# Patient Record
Sex: FEMALE | Race: WHITE | Employment: FULL TIME | URBAN - METROPOLITAN AREA
[De-identification: names, ages, dates, MRNs, and addresses within clinical notes are randomized per-mention and may not be internally consistent; named-entity substitution may affect disease eponyms.]

---

## 2017-03-01 ENCOUNTER — OFFICE VISIT (OUTPATIENT)
Dept: MIDWIFE SERVICES | Facility: CLINIC | Age: 52
End: 2017-03-01
Payer: COMMERCIAL

## 2017-03-01 VITALS
HEART RATE: 57 BPM | BODY MASS INDEX: 23.06 KG/M2 | TEMPERATURE: 97 F | SYSTOLIC BLOOD PRESSURE: 116 MMHG | WEIGHT: 155.7 LBS | HEIGHT: 69 IN | DIASTOLIC BLOOD PRESSURE: 55 MMHG

## 2017-03-01 DIAGNOSIS — Z00.00 ROUTINE GENERAL MEDICAL EXAMINATION AT A HEALTH CARE FACILITY: Primary | ICD-10-CM

## 2017-03-01 PROCEDURE — 99396 PREV VISIT EST AGE 40-64: CPT | Performed by: ADVANCED PRACTICE MIDWIFE

## 2017-03-01 NOTE — PROGRESS NOTES
Ashanti is a 51 year old  female who presents for annual exam.     Menses are 25-28 days and normal lasting 4 days.  Menses flow: normal.  Patient's last menstrual period was 2017 (exact date).. Using none for contraception.  She is not currently considering pregnancy. She is noticing that she is skipping more cycles. Feels like it comes whenever it wants.   Besides routine health maintenance, she has no other health concerns today.  has been having some health concerns that makes intercourse difficult - discussed that.   GYNECOLOGIC HISTORY:  Menarche: 15  Age at first intercourse: 42 Number of lifetime partners: <6  Ashanti is sexually active with male partner(s) and is currently in monogamous relationship.    History sexually transmitted infections:No STD history  STI testing offered?  Declined  YURIDIA exposure: No  History of abnormal Pap smear: NO - age 30- 65 PAP every 3 years recommended  Family history of breast CA: No  Family history of uterine/ovarian CA: No    Family history of colon CA: No    HEALTH MAINTENANCE:  Cholesterol:   Cholesterol   Date Value Ref Range Status   2013 186 0 - 200 mg/dL Final     Comment:     LDL Cholesterol is the primary guide to therapy.   The NCEP recommends further evaluation of: patients with cholesterol greater   than 200 mg/dL if additional risk factors are present, cholesterol greater   than   240 mg/dL, triglycerides greater than 150 mg/dL, or HDL less than 40 mg/dL.   07/10/2007 207 (H) 0 - 200 mg/dL Final     Comment:     LDL Cholesterol is the primary guide to therapy: LDL-cholesterol goal in high   risk patients is <100 mg/dL and in very high risk patients is <70 mg/dL.   The NCEP recommends further evaluation of: patients with cholesterol <200   mg/dL   if additionalrisk factors are present, cholesterol >240 mg/dL, triglycerides   >150 mg/dL, or HDL <40 mg/dL.    History of abnormal lipids: No  Mammo: 6/17/15 . History of abnormal Mammo: No,  .  Regular Self Breast Exams: Yes  Calcium/Vitamin D intake: source:  dairy Adequate? Yes  TSH:   TSH   Date Value Ref Range Status   2015 1.93 0.40 - 4.00 mU/L Final     Comment:     Effective 2014, the reference range for this assay has changed to reflect   new instrumentation/methodology.       Lab Results   Component Value Date    PAP OTHER-NIL, See Result 2016    PAP NIL 2015    PAP OTHER-NIL, See Result 2012     HISTORY:  Obstetric History       T0      TAB0   SAB0   E0   M0   L0         Past Medical History   Diagnosis Date     CARDIOVASCULAR SCREENING; LDL GOAL LESS THAN 130      Cervical high risk HPV (human papillomavirus) test positive 3/2015     NIL pap, + HPV (not 16 or 18)     Dislocation of patella, left, closed      NO ACTIVE PROBLEMS      Past Surgical History   Procedure Laterality Date     C appendectomy       acute     Family History   Problem Relation Age of Onset     C.A.D. Father      MI  age 59     DIABETES Father      Alcohol/Drug Brother      ETOH     Cardiovascular Brother      CEREBROVASCULAR DISEASE Maternal Grandfather      CANCER Maternal Grandmother      ovarian     C.A.D. Paternal Grandfather      MI     HEART DISEASE Brother      right exterior septal myocardial fibrosis/arteriosclorsis     Gynecology Maternal Aunt      ovarian cysts     Gynecology Maternal Aunt      uterine tumor     Lipids Mother      resolved     Other - See Comments Paternal Grandmother      reumatic fever     CEREBROVASCULAR DISEASE Brother      HEART DISEASE Brother      Social History     Social History     Marital status:      Spouse name: N/A     Number of children: N/A     Years of education: N/A     Social History Main Topics     Smoking status: Never Smoker     Smokeless tobacco: Never Used     Alcohol use 0.5 - 1.5 oz/week     1 - 3 drink(s) per week     Drug use: No     Sexual activity: Yes     Partners: Male     Birth control/ protection:       Other Topics Concern     Not on file     Social History Narrative    Caffeine intake/servings daily - 12 oz decafe    Calcium intake/servings daily - 8 oz of milk, yogurt and cheese (veggies)    Exercise 3 times weekly - 3-4    Sunscreen used - yes    Seatbelts used - yes    Guns stored in the home -no    Self Breast Exam - yes    Pap test up to date - yes     Eye exam up to date - no. Pt has never had a eye exam.  Pt states no need for glasses    Dental exam up to date -  yes    DEXA scan up to date -  Not Applicable    Flex Sig/Colonoscopy up to date -  Not Applicable    Mammography up to date -  Yes, 3/16/11 NEG    Immunizations reviewed and up to date - Yes and TD 2009    Abuse: Current or Past (Physical, Sexual or Emotional) - No    Do you feel safe in your environment - Yes    Do you cope well with stress - Yes    Do you suffer from insomnia - No    Last Updated BY: Marie Clifford MA 1/23/2012                       Current Outpatient Prescriptions:      Glucosamine-Chondroitin (GLUCOSAMINE CHONDR COMPLEX PO), , Disp: , Rfl:      Omega-3 Fatty Acids (OMEGA 3 PO), Take  by mouth daily., Disp: , Rfl:      Prenatal MV-Min-Fe Cbn-FA-DHA (BRAINSTRONG PRENATAL) 33-0.8 & 350 MG MISC, Take 1 tablet by mouth daily., Disp: , Rfl:      Allergies   Allergen Reactions     No Known Drug Allergies        Past medical, surgical, social and family history were reviewed and updated in EPIC.    ROS:   C:     NEGATIVE for fever, chills, change in weight  I:       NEGATIVE for worrisome rashes, moles or lesions  E:     NEGATIVE for vision changes or irritation  E/M: NEGATIVE for ear, mouth and throat problems  R:     NEGATIVE for significant cough or SOB  CV:   NEGATIVE for chest pain, palpitations or peripheral edema  GI:     NEGATIVE for nausea, abdominal pain, heartburn, or change in bowel habits  :   NEGATIVE for frequency, dysuria, hematuria, vaginal discharge, or irregular bleeding  M:     NEGATIVE for significant  "arthralgias or myalgia  N:      NEGATIVE for weakness, dizziness or paresthesias  E:      NEGATIVE for temperature intolerance, skin/hair changes  P:      NEGATIVE for changes in mood or affect.    EXAM:  /55  Pulse 57  Temp 97  F (36.1  C) (Oral)  Ht 5' 8.5\" (1.74 m)  Wt 155 lb 11.2 oz (70.6 kg)  LMP 01/05/2017 (Exact Date)  BMI 23.33 kg/m2   BMI: Body mass index is 23.33 kg/(m^2).  Constitutional: healthy, alert and no distress  Head: Normocephalic. No masses, lesions, tenderness or abnormalities  Neck: Neck supple. Trachea midline. No adenopathy. Thyroid symmetric, normal size.   Cardiovascular: RRR.   Respiratory: Negative.   Breast: Breasts reveal mild symmetric fibrocystic densities, but there are no dominant, discrete, fixed or suspicious masses found.  Gastrointestinal: Abdomen soft, non-tender, non-distended. No masses, organomegaly.  :  Vulva:  No external lesions, normal female hair distribution, no inguinal adenopathy.    Urethra:  Midline, non-tender, well supported, no discharge  Vagina:  Moist, pink, no abnormal discharge, no lesions  Uterus:  Normal size, midposition , non-tender, freely mobile  Ovaries:  No masses appreciated, non-tender, mobile  Musculoskeletal: extremities normal  Skin: no suspicious lesions or rashes  Psychiatric: Affect appropriate, cooperative,mentation appears normal.     COUNSELING:        Regular exercise       Healthy diet/nutrition       Osteoporosis Prevention/Bone Health   reports that she has never smoked. She has never used smokeless tobacco.    Body mass index is 23.33 kg/(m^2).    ASSESSMENT:  51 year old female with satisfactory annual exam  (Z00.00) Routine general medical examination at a health care facility  (primary encounter diagnosis)  Comment:   Plan: MA Screening Digital Bilateral, TSH with free         T4 reflex, Lipid Profile       PLAN:   Order in for future labs (TSH and Lipid profile). Will plan to come fasting on a morning that works best for " her. Will call to schedule lab only visit.   Will make appointment for mammogram  RTC yearly for annual exam or sooner stan James CNM

## 2017-03-01 NOTE — NURSING NOTE
"Chief Complaint   Patient presents with     Physical     annual       Initial /55  Pulse 57  Temp 97  F (36.1  C) (Oral)  Ht 5' 8.5\" (1.74 m)  Wt 155 lb 11.2 oz (70.6 kg)  LMP 2017 (Exact Date)  BMI 23.33 kg/m2 Estimated body mass index is 23.33 kg/(m^2) as calculated from the following:    Height as of this encounter: 5' 8.5\" (1.74 m).    Weight as of this encounter: 155 lb 11.2 oz (70.6 kg).  BP completed using cuff size: regular        The following HM Due: NONE      The following patient reported/Care Every where data was sent to:  P ABSTRACT QUALITY INITIATIVES [[id number]]       patient has appointment for today  Anabel Guan               "

## 2017-03-01 NOTE — MR AVS SNAPSHOT
After Visit Summary   3/1/2017    Ashanti Yanes    MRN: [id number]           Patient Information     Date Of Birth          1965        Visit Information        Provider Department      3/1/2017 5:15 PM Gloria James APRN CNM [facility] [place] [facility]        Today's Diagnoses     Routine general medical examination at a health care facility    -  1       Follow-ups after your visit        Future tests that were ordered for you today     Open Future Orders        Priority Expected Expires Ordered    TSH with free T4 reflex Routine  3/1/2018 3/1/2017    Lipid Profile Routine  3/1/2018 3/1/2017    MA Screening Digital Bilateral Routine  3/1/2018 3/1/2017            Who to contact     If you have questions or need follow up information about today's clinic visit or your schedule please contact [facility] directly at [phone].  Normal or non-critical lab and imaging results will be communicated to you by MyChart, letter or phone within 4 business days after the clinic has received the results. If you do not hear from us within 7 days, please contact the clinic through [employer] or phone. If you have a critical or abnormal lab result, we will notify you by phone as soon as possible.  Submit refill requests through [employer] or call your pharmacy and they will forward the refill request to us. Please allow 3 business days for your refill to be completed.          Additional Information About Your Visit        MyChart Information     [employer] gives you secure access to your electronic health record. If you see a primary care provider, you can also send messages to your care team and make appointments. If you have questions, please call your primary care clinic.  If you do not have a primary care provider, please call [phone] and they will assist you.        Care EveryWhere ID     This is your Care EveryWhere ID. This could be used by other organizations to  "access your [place] medical records  [id number]        Your Vitals Were     Pulse Temperature Height Last Period BMI (Body Mass Index)       57 97  F (36.1  C) (Oral) 5' 8.5\" (1.74 m) 01/05/2017 (Exact Date) 23.33 kg/m2        Blood Pressure from Last 3 Encounters:   03/01/17 116/55   03/17/16 117/67   03/27/15 113/49    Weight from Last 3 Encounters:   03/01/17 155 lb 11.2 oz (70.6 kg)   03/17/16 157 lb 6.4 oz (71.4 kg)   03/27/15 147 lb 9.6 oz (67 kg)               Primary Care Provider    M[facility] Other Clinic                Thank you!     Thank you for choosing [facility]  for your care. Our goal is always to provide you with excellent care. Hearing back from our patients is one way we can continue to improve our services. Please take a few minutes to complete the written survey that you may receive in the mail after your visit with us. Thank you!             Your Updated Medication List - Protect others around you: Learn how to safely use, store and throw away your medicines at www.disposemymeds.org.          This list is accurate as of: 3/1/17 11:59 PM.  Always use your most recent med list.                   Brand Name Dispense Instructions for use    BRAINSTRONG PRENATAL 33-0.8 & 350 MG Misc      Take 1 tablet by mouth daily.       GLUCOSAMINE CHONDR COMPLEX PO          OMEGA 3 PO      Take  by mouth daily.         "

## 2017-03-31 DIAGNOSIS — Z00.00 ROUTINE GENERAL MEDICAL EXAMINATION AT A HEALTH CARE FACILITY: ICD-10-CM

## 2017-03-31 LAB
CHOLEST SERPL-MCNC: 203 MG/DL
HDLC SERPL-MCNC: 68 MG/DL
LDLC SERPL CALC-MCNC: 125 MG/DL
NONHDLC SERPL-MCNC: 135 MG/DL
TRIGL SERPL-MCNC: 51 MG/DL
TSH SERPL DL<=0.005 MIU/L-ACNC: 1.67 MU/L (ref 0.4–4)

## 2017-03-31 PROCEDURE — 36415 COLL VENOUS BLD VENIPUNCTURE: CPT | Performed by: ADVANCED PRACTICE MIDWIFE

## 2017-03-31 PROCEDURE — 80061 LIPID PANEL: CPT | Performed by: ADVANCED PRACTICE MIDWIFE

## 2017-03-31 PROCEDURE — 84443 ASSAY THYROID STIM HORMONE: CPT | Performed by: ADVANCED PRACTICE MIDWIFE

## 2018-05-01 ENCOUNTER — OFFICE VISIT (OUTPATIENT)
Dept: MIDWIFE SERVICES | Facility: CLINIC | Age: 53
End: 2018-05-01
Payer: COMMERCIAL

## 2018-05-01 VITALS
SYSTOLIC BLOOD PRESSURE: 94 MMHG | OXYGEN SATURATION: 97 % | WEIGHT: 150.3 LBS | DIASTOLIC BLOOD PRESSURE: 66 MMHG | BODY MASS INDEX: 22.26 KG/M2 | HEART RATE: 75 BPM | HEIGHT: 69 IN

## 2018-05-01 DIAGNOSIS — Z00.00 ROUTINE GENERAL MEDICAL EXAMINATION AT A HEALTH CARE FACILITY: Primary | ICD-10-CM

## 2018-05-01 PROCEDURE — 99396 PREV VISIT EST AGE 40-64: CPT | Performed by: ADVANCED PRACTICE MIDWIFE

## 2018-05-01 NOTE — NURSING NOTE
"Chief Complaint   Patient presents with     Physical     LMP 2017       Initial BP 94/66  Pulse 75  Ht 5' 8.5\" (1.74 m)  Wt 150 lb 4.8 oz (68.2 kg)  LMP 2017 (Exact Date)  SpO2 97%  BMI 22.52 kg/m2 Estimated body mass index is 22.52 kg/(m^2) as calculated from the following:    Height as of this encounter: 5' 8.5\" (1.74 m).    Weight as of this encounter: 150 lb 4.8 oz (68.2 kg).  BP completed using cuff size: regular        The following HM Due:  dexa, mammo and colon      The following patient reported/Care Every where data was sent to:  P ABSTRACT QUALITY INITIATIVES [[id number]]  na      patient has appointment for today and orders have been placed              "

## 2018-05-01 NOTE — MR AVS SNAPSHOT
"              After Visit Summary   5/1/2018    Ashanti Yanes    MRN: [id number]           Patient Information     Date Of Birth          1965        Visit Information        Provider Department      5/1/2018 3:00 PM Gloria James APRN CNM [facility]        Today's Diagnoses     Routine general medical examination at a health care facility    -  1       Follow-ups after your visit        Who to contact     If you have questions or need follow up information about today's clinic visit or your schedule please contact [facility] directly at [phone].  Normal or non-critical lab and imaging results will be communicated to you by MyChart, letter or phone within 4 business days after the clinic has received the results. If you do not hear from us within 7 days, please contact the clinic through [employer] or phone. If you have a critical or abnormal lab result, we will notify you by phone as soon as possible.  Submit refill requests through [employer] or call your pharmacy and they will forward the refill request to us. Please allow 3 business days for your refill to be completed.          Additional Information About Your Visit        MyChart Information     [employer] gives you secure access to your electronic health record. If you see a primary care provider, you can also send messages to your care team and make appointments. If you have questions, please call your primary care clinic.  If you do not have a primary care provider, please call [phone] and they will assist you.        Care EveryWhere ID     This is your Care EveryWhere ID. This could be used by other organizations to access your [place] medical records  [id number]        Your Vitals Were     Pulse Height Last Period Pulse Oximetry BMI (Body Mass Index)       75 5' 8.5\" (1.74 m) 01/05/2017 (Exact Date) 97% 22.52 kg/m2        Blood Pressure from Last 3 Encounters:   05/01/18 94/66   03/01/17 " 116/55   03/17/16 117/67    Weight from Last 3 Encounters:   05/01/18 150 lb 4.8 oz (68.2 kg)   03/01/17 155 lb 11.2 oz (70.6 kg)   03/17/16 157 lb 6.4 oz (71.4 kg)              Today, you had the following     No orders found for display       Primary Care Provider Fax #    Physician No Ref-Primary [phone]       No address on file        Equal Access to Services     ABDELRAHMAN ASTUDILLO : Hadii aad ku hadasho Soomaali, waaxda luqadaha, qaybta kaalmada adeegyada, waxay tonyain thomas cole . So [facility] [phone].    ATENCIÓN: Si habla español, tiene a parham disposición servicios gratuitos de asistencia lingüística. Llame al [phone].    We comply with applicable federal civil rights laws and [place] laws. We do not discriminate on the basis of race, color, national origin, age, disability, sex, sexual orientation, or gender identity.            Thank you!     Thank you for choosing [facility]  for your care. Our goal is always to provide you with excellent care. Hearing back from our patients is one way we can continue to improve our services. Please take a few minutes to complete the written survey that you may receive in the mail after your visit with us. Thank you!             Your Updated Medication List - Protect others around you: Learn how to safely use, store and throw away your medicines at www.disposemymeds.org.          This list is accurate as of 5/1/18 11:59 PM.  Always use your most recent med list.                   Brand Name Dispense Instructions for use Diagnosis    GLUCOSAMINE CHONDR COMPLEX PO           OMEGA 3 PO      Take  by mouth daily.        ONE-A-DAY WOMENS PO

## 2018-05-01 NOTE — PROGRESS NOTES
Ashanti is a 52 year old  who presents for annual exam.   Postmenopausal.  She is not having vaginal dryness. No vaginal bleeding noted.     Besides routine health maintenance, she has no other health concerns today . LMP was in 2017. Still having cyclical symptoms (bloating, cramping) that make her think she is going to get a period but then she never bleeds. She does not notice significant symptoms related to menopause.  still having problems so unable to have intercourse.  GYNECOLOGIC HISTORY:  Menarche: 15     She is sexually active with 1male partner(s) and she is currently in monogamous relationship.    History sexually transmitted infections:No STD history  STI testing offered?  Declined  Estrogen replacement therapy: No  YURIDIA exposure: Unknown    History of abnormal Pap smear: NO - age 30- 65 PAP every 3 years recommended  Family history of breast CA: No  Family history of uterine/ovarian CA: Yes (Please explain): mgm and m-aunt  Family history of colon CA: No    HEALTH MAINTENANCE:  Cholesterol: (No components found for: CHOL2 ) History of abnormal lipids: Yes  Mammo: 3/2017 . History of abnormal Mammo: No  Regular Self Breast Exams: Yes  Colonoscopy: na History of abnormal Colonoscopy: No  Dexa: na History of abnormal Dexa: dont done  Calcium/Vitamin D intake: source:  dairy Adequate? Yes  TSH: (No components found for: TSH1 )  Pap; (  Lab Results   Component Value Date    PAP OTHER-NIL, See Result 2016    PAP NIL 2015    PAP OTHER-NIL, See Result 2012    )    HISTORY:  Obstetric History       T0      L0     SAB0   TAB0   Ectopic0   Multiple0   Live Births0         Past Medical History:   Diagnosis Date     CARDIOVASCULAR SCREENING; LDL GOAL LESS THAN 130      Cervical high risk HPV (human papillomavirus) test positive 3/2015    NIL pap, + HPV (not 16 or 18)     Dislocation of patella, left, closed      NO ACTIVE PROBLEMS      Past Surgical History:    Procedure Laterality Date     C APPENDECTOMY  1986    acute     Family History   Problem Relation Age of Onset     C.A.D. Father      MI  age 59     DIABETES Father      CEREBROVASCULAR DISEASE Maternal Grandfather      CANCER Maternal Grandmother      ovarian     C.A.D. Paternal Grandfather      MI     Lipids Mother      resolved     Other - See Comments Paternal Grandmother      reumatic fever     CEREBROVASCULAR DISEASE Brother      HEART DISEASE Brother      Alcohol/Drug Brother      ETOH     Cardiovascular Brother      HEART DISEASE Brother      right exterior septal myocardial fibrosis/arteriosclorsis     Gynecology Maternal Aunt      ovarian cysts     Gynecology Maternal Aunt      uterine tumor     Social History     Social History     Marital status:      Spouse name: N/A     Number of children: N/A     Years of education: N/A     Social History Main Topics     Smoking status: Never Smoker     Smokeless tobacco: Never Used     Alcohol use 0.5 - 1.5 oz/week     1 - 3 drink(s) per week     Drug use: No     Sexual activity: Yes     Partners: Male     Birth control/ protection:      Other Topics Concern     None     Social History Narrative    Caffeine intake/servings daily - 12 oz decafe    Calcium intake/servings daily - 8 oz of milk, yogurt and cheese (veggies)    Exercise 3 times weekly - 3-4    Sunscreen used - yes    Seatbelts used - yes    Guns stored in the home -no    Self Breast Exam - yes    Pap test up to date - yes     Eye exam up to date - no. Pt has never had a eye exam.  Pt states no need for glasses    Dental exam up to date -  yes    DEXA scan up to date -  Not Applicable    Flex Sig/Colonoscopy up to date -  Not Applicable    Mammography up to date -  Yes, 3/16/11 NEG    Immunizations reviewed and up to date - Yes and TD 2009    Abuse: Current or Past (Physical, Sexual or Emotional) - No    Do you feel safe in your environment - Yes    Do you cope well with stress - Yes    Do you  "suffer from insomnia - No    Last Updated BY: Marie Clifford MA 1/23/2012                       Current Outpatient Prescriptions:      Multiple Vitamins-Calcium (ONE-A-DAY WOMENS PO), , Disp: , Rfl:      Glucosamine-Chondroitin (GLUCOSAMINE CHONDR COMPLEX PO), , Disp: , Rfl:      Omega-3 Fatty Acids (OMEGA 3 PO), Take  by mouth daily., Disp: , Rfl:      Allergies   Allergen Reactions     No Known Drug Allergies        Past medical, surgical, social and family history were reviewed and updated in EPIC.    ROS:   C:       NEGATIVE for fever, chills, change in weight  I:         NEGATIVE for worrisome rashes, moles or lesions  E:       NEGATIVE for vision changes or irritation  E/M:   NEGATIVE for ear, mouth and throat problems  R:       NEGATIVE for significant cough or SOB  CV:     NEGATIVE for chest pain, palpitations or peripheral edema  GI:      NEGATIVE for nausea, abdominal pain, heartburn, or change in bowel habits  :    NEGATIVE for frequency, dysuria, hematuria, vaginal discharge, or bleeding  M:       NEGATIVE for significant arthralgias or myalgia  N:       NEGATIVE for weakness, dizziness or paresthesias  E:       NEGATIVE for temperature intolerance, skin/hair changes  P:       NEGATIVE for changes in mood or affect    EXAM:  Pulse 75  Ht 5' 8.5\" (1.74 m)  Wt 150 lb 4.8 oz (68.2 kg)  LMP 01/05/2017 (Exact Date)  SpO2 97%  BMI 22.52 kg/m2   BMI: Body mass index is 22.52 kg/(m^2).  Constitutional: healthy, alert and no distress  Head: Normocephalic. No masses, lesions, tenderness or abnormalities  Neck: Neck supple. Trachea midline. No adenopathy. Thyroid symmetric, normal size.   Cardiovascular: RRR.   Respiratory: Negative.   Breast: No nodularity, asymmetry or nipple discharge bilaterally.  Gastrointestinal: Abdomen soft, non-tender, non-distended. No masses, organomegaly  :  Vulva:  No external lesions, normal female hair distribution, no inguinal adenopathy.    Urethra:  Midline, non-tender, well " supported, no discharge  Vagina:  Atrophic, no abnormal discharge, no lesions  Uterus:  Normal size, anteverted , non-tender, freely mobile  Ovaries:  No masses appreciated, non-tender, mobile  Rectal Exam: deferred  Musculoskeletal: extremities normal  Skin: no suspicious lesions or rashes  Psychiatric: Affect appropriate, cooperative,mentation appears normal.     COUNSELING:        Regular exercise       Healthy diet/nutrition       Osteoporosis Prevention/Bone Health   reports that she has never smoked. She has never used smokeless tobacco.    Body mass index is 22.52 kg/(m^2).     ASSESSMENT/PLAN:  52 year old  with satisfactory annual exam      Pt prefers mammogram screening every other year   Will plan for mammogram, TSH and pap in 2019  Due to start FIT screening as well.  Return to clinic yearly for annual exam or sooner prn  Gloria James CNM

## 2018-06-03 ENCOUNTER — HEALTH MAINTENANCE LETTER (OUTPATIENT)
Age: 53
End: 2018-06-03

## 2019-05-14 ENCOUNTER — OFFICE VISIT (OUTPATIENT)
Dept: MIDWIFE SERVICES | Facility: CLINIC | Age: 54
End: 2019-05-14
Payer: COMMERCIAL

## 2019-05-14 VITALS
WEIGHT: 153 LBS | HEART RATE: 56 BPM | SYSTOLIC BLOOD PRESSURE: 113 MMHG | BODY MASS INDEX: 22.66 KG/M2 | OXYGEN SATURATION: 95 % | HEIGHT: 69 IN | DIASTOLIC BLOOD PRESSURE: 69 MMHG

## 2019-05-14 DIAGNOSIS — Z12.31 ENCOUNTER FOR SCREENING MAMMOGRAM FOR BREAST CANCER: ICD-10-CM

## 2019-05-14 DIAGNOSIS — Z12.4 SCREENING FOR CERVICAL CANCER: ICD-10-CM

## 2019-05-14 DIAGNOSIS — Z13.29 SCREENING FOR THYROID DISORDER: ICD-10-CM

## 2019-05-14 DIAGNOSIS — Z01.419 ENCOUNTER FOR GYNECOLOGICAL EXAMINATION WITHOUT ABNORMAL FINDING: Primary | ICD-10-CM

## 2019-05-14 DIAGNOSIS — Z12.11 SPECIAL SCREENING FOR MALIGNANT NEOPLASMS, COLON: ICD-10-CM

## 2019-05-14 PROCEDURE — G0145 SCR C/V CYTO,THINLAYER,RESCR: HCPCS | Performed by: ADVANCED PRACTICE MIDWIFE

## 2019-05-14 PROCEDURE — 87624 HPV HI-RISK TYP POOLED RSLT: CPT | Performed by: ADVANCED PRACTICE MIDWIFE

## 2019-05-14 PROCEDURE — 84443 ASSAY THYROID STIM HORMONE: CPT | Performed by: ADVANCED PRACTICE MIDWIFE

## 2019-05-14 PROCEDURE — 99396 PREV VISIT EST AGE 40-64: CPT | Performed by: ADVANCED PRACTICE MIDWIFE

## 2019-05-14 PROCEDURE — 36415 COLL VENOUS BLD VENIPUNCTURE: CPT | Performed by: ADVANCED PRACTICE MIDWIFE

## 2019-05-14 ASSESSMENT — MIFFLIN-ST. JEOR: SCORE: 1369.41

## 2019-05-14 NOTE — LETTER
May 22, 2019    Ashanti Darling Bonnie Yanes  [address]  [facility] [address]    Dear Ms.Chagnon Yanes,  This letter is regarding your recent Pap smear (cervical cancer screening) and Human Papillomavirus (HPV) test.  We are happy to inform you that your Pap smear result is normal. Cervical cancer is closely linked with certain types of HPV. Your results showed no evidence of high-risk HPV.  Therefore we recommend you return in 3 years for your next pap smear.  You will still need to return to the clinic every year for an annual exam and other preventive tests.  If you have additional questions regarding this result, please call our registered nurse, Marely at [phone].  Sincerely,    PARTHA Zavaleta CNM/lala

## 2019-05-14 NOTE — PROGRESS NOTES
Ashanti is a 53 year old  female who presents for annual exam.     Menses are not present 6 months and 0 lasting 0 days.  Menses flow: with clots.  2018. Using none for contraception.  She is not currently considering pregnancy.  Besides routine health maintenance, she has no other health concerns today . 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 Had an episode of lightheaded/dizziness. Did not pass out. No obvious explanation.   GYNECOLOGIC HISTORY:  Menarche:     Ashanti is sexually active with 1male partner(s) and is currently in monogamous relationship with .    History sexually transmitted infections:No STD history  STI testing offered?  Declined  YURIDIA exposure: No  History of abnormal Pap smear: NO - age 30- 65 PAP every 3 years recommended  Family history of breast CA: No  Family history of uterine/ovarian CA: m grandmother    Family history of colon CA: No    HEALTH MAINTENANCE:  Cholesterol:  Cholesterol   Date Value Ref Range Status   2017 203 (H) <200 mg/dL Final     Comment:     Desirable:       <200 mg/dl   2013 186 0 - 200 mg/dL Final     Comment:     LDL Cholesterol is the primary guide to therapy.   The NCEP recommends further evaluation of: patients with cholesterol greater   than 200 mg/dL if additional risk factors are present, cholesterol greater   than   240 mg/dL, triglycerides greater than 150 mg/dL, or HDL less than 40 mg/dL.    History of abnormal lipids: No  Mammo: 2017 . History of abnormal Mammo: Not applicable, .  Regular Self Breast Exams: Yes  Calcium/Vitamin D intake: source:  dairy, dietary supplement(s) Adequate? No  TSH:  TSH   Date Value Ref Range Status   2017 1.67 0.40 - 4.00 mU/L Final     Lab Results   Component Value Date    PAP OTHER-NIL, See Result 2016    PAP NIL 2015    PAP OTHER-NIL, See Result 2012       HISTORY:  OB History    Para Term  AB Living   0 0 0 0  0 0   SAB TAB Ectopic Multiple Live Births   0 0 0 0 0     Past Medical History:   Diagnosis Date     CARDIOVASCULAR SCREENING; LDL GOAL LESS THAN 130      Cervical high risk HPV (human papillomavirus) test positive 3/2015    NIL pap, + HPV (not 16 or 18)     Dislocation of patella, left, closed      NO ACTIVE PROBLEMS      Past Surgical History:   Procedure Laterality Date     C APPENDECTOMY  1986    acute     Family History   Problem Relation Age of Onset     C.A.D. Father         MI  age 59     Diabetes Father      Cerebrovascular Disease Maternal Grandfather      Cancer Maternal Grandmother         ovarian     C.A.D. Paternal Grandfather         MI     Lipids Mother         resolved     Other - See Comments Paternal Grandmother         reumatic fever     Cerebrovascular Disease Brother      Heart Disease Brother      Alcohol/Drug Brother         ETOH     Cardiovascular Brother      Heart Disease Brother         right exterior septal myocardial fibrosis/arteriosclorsis     Gynecology Maternal Aunt         ovarian cysts     Gynecology Maternal Aunt         uterine tumor     Social History     Socioeconomic History     Marital status:      Spouse name: None     Number of children: None     Years of education: None     Highest education level: None   Occupational History     None   Social Needs     Financial resource strain: None     Food insecurity:     Worry: None     Inability: None     Transportation needs:     Medical: None     Non-medical: None   Tobacco Use     Smoking status: Never Smoker     Smokeless tobacco: Never Used   Substance and Sexual Activity     Alcohol use: Yes     Alcohol/week: 0.5 - 1.5 oz     Types: 1 - 3 Standard drinks or equivalent per week     Comment: occ     Drug use: No     Sexual activity: Yes     Partners: Male   Lifestyle     Physical activity:     Days per week: None     Minutes per session: None     Stress: None   Relationships     Social connections:     Talks on phone:  None     Gets together: None     Attends [employer] service: None     Active member of club or organization: None     Attends meetings of clubs or organizations: None     Relationship status: None     Intimate partner violence:     Fear of current or ex partner: None     Emotionally abused: None     Physically abused: None     Forced sexual activity: None   Other Topics Concern     Parent/sibling w/ CABG, MI or angioplasty before 65F 55M? Not Asked   Social History Narrative    Caffeine intake/servings daily - 12 oz decafe    Calcium intake/servings daily - 8 oz of milk, yogurt and cheese (veggies)    Exercise 3 times weekly - 3-4    Sunscreen used - yes    Seatbelts used - yes    Guns stored in the home -no    Self Breast Exam - yes    Pap test up to date - yes     Eye exam up to date - no. Pt has never had a eye exam.  Pt states no need for glasses    Dental exam up to date -  yes    DEXA scan up to date -  Not Applicable    Flex Sig/Colonoscopy up to date -  Not Applicable    Mammography up to date -  Yes, 3/16/11 NEG    Immunizations reviewed and up to date - Yes and TD 2009    Abuse: Current or Past (Physical, Sexual or Emotional) - No    Do you feel safe in your environment - Yes    Do you cope well with stress - Yes    Do you suffer from insomnia - No    Last Updated BY: Marie Clifford MA 1/23/2012                       Current Outpatient Medications:      Glucosamine-Chondroitin (GLUCOSAMINE CHONDR COMPLEX PO), , Disp: , Rfl:      Multiple Vitamins-Calcium (ONE-A-DAY WOMENS PO), , Disp: , Rfl:      Omega-3 Fatty Acids (OMEGA 3 PO), Take  by mouth daily., Disp: , Rfl:      Allergies   Allergen Reactions     No Known Drug Allergies        Past medical, surgical, social and family history were reviewed and updated in EPIC.    ROS:   C:     NEGATIVE for fever, chills, change in weight  I:       NEGATIVE for worrisome rashes, moles or lesions  E:     NEGATIVE for vision changes or irritation  E/M: NEGATIVE for ear,  "mouth and throat problems  R:     NEGATIVE for significant cough or SOB  CV:   NEGATIVE for chest pain, palpitations or peripheral edema  GI:     NEGATIVE for nausea, abdominal pain, heartburn, or change in bowel habits  :   NEGATIVE for frequency, dysuria, hematuria, vaginal discharge, or irregular bleeding  M:     NEGATIVE for significant arthralgias or myalgia  N:      NEGATIVE for weakness, dizziness or paresthesias  E:      NEGATIVE for temperature intolerance, skin/hair changes  P:      NEGATIVE for changes in mood or affect.    EXAM:  /69   Pulse 56   Ht 1.746 m (5' 8.75\")   Wt 69.4 kg (153 lb)   LMP 01/05/2017 (Exact Date)   SpO2 95%   Breastfeeding? No   BMI 22.76 kg/m     BMI: Body mass index is 22.76 kg/m .  Constitutional: healthy, alert and no distress  Head: Normocephalic. No masses, lesions, tenderness or abnormalities  Neck: Neck supple. Trachea midline. No adenopathy. Thyroid symmetric, normal size.   Cardiovascular: RRR.   Respiratory: Negative.   Breast: No nodularity, asymmetry or nipple discharge bilaterally.  Gastrointestinal: Abdomen soft, non-tender, non-distended. No masses, organomegaly.  :  Vulva:  No external lesions, normal female hair distribution, no inguinal adenopathy.    Urethra:  Midline, non-tender, well supported, no discharge  Vagina:  Moist, pink, no abnormal discharge, no lesions  Cervix: Pink, smooth, no visible lesions  Uterus:  Normal size, non-tender, freely mobile  Ovaries:  No masses appreciated, non-tender, mobile  Rectal Exam: deferred  Musculoskeletal: extremities normal  Skin: no suspicious lesions or rashes  Psychiatric: Affect appropriate, cooperative,mentation appears normal.     COUNSELING:        Regular exercise       Healthy diet/nutrition       Osteoporosis Prevention/Bone Health       Colon cancer screening   reports that she has never smoked. She has never used smokeless tobacco.    Body mass index is 22.76 kg/m .    ASSESSMENT:  53 year " old female with satisfactory annual exam  (Z01.411) Encounter for gynecological examination with abnormal finding  (primary encounter diagnosis)  Comment:   Plan:     (Z01.419) Encounter for gynecological examination without abnormal finding  Comment:   Plan:     (Z12.4) Screening for cervical cancer  Comment:   Plan: Pap imaged thin layer screen with HPV -         recommended age 30 - 65 years (select HPV order        below), HPV High Risk Types DNA Cervical            (Z13.29) Screening for thyroid disorder  Comment:   Plan: TSH with free T4 reflex            (Z12.31) Encounter for screening mammogram for breast cancer  Comment:   Plan: MA Screening Digital Bilateral            (Z12.11) Special screening for malignant neoplasms, colon  Comment:   Plan: Fecal colorectal cancer screen (FIT)          RTC yearly for annual exam or sooner prn  Will notify patient of results via MyChart or phone call if f/u needed.  Gloria James CNM

## 2019-05-14 NOTE — NURSING NOTE
"Chief Complaint   Patient presents with     Physical       Initial /69   Pulse 56   Ht 1.746 m (5' 8.75\")   Wt 69.4 kg (153 lb)   LMP 2017 (Exact Date)   SpO2 95%   Breastfeeding? No   BMI 22.76 kg/m   Estimated body mass index is 22.76 kg/m  as calculated from the following:    Height as of this encounter: 1.746 m (5' 8.75\").    Weight as of this encounter: 69.4 kg (153 lb).  BP completed using cuff size: regular    Questioned patient about current smoking habits.  Pt. has never smoked.          The following HM Due: mammogram  pap smear      The following patient reported/Care Every where data was sent to:  P ABSTRACT QUALITY INITIATIVES [[id number]]  none      n/a              "

## 2019-05-16 LAB — TSH SERPL DL<=0.005 MIU/L-ACNC: 1.39 MU/L (ref 0.4–4)

## 2019-05-17 LAB
COPATH REPORT: NORMAL
PAP: NORMAL

## 2019-05-21 LAB
FINAL DIAGNOSIS: NORMAL
HPV HR 12 DNA CVX QL NAA+PROBE: NEGATIVE
HPV16 DNA SPEC QL NAA+PROBE: NEGATIVE
HPV18 DNA SPEC QL NAA+PROBE: NEGATIVE
SPECIMEN DESCRIPTION: NORMAL
SPECIMEN SOURCE CVX/VAG CYTO: NORMAL

## 2019-06-29 PROCEDURE — 82274 ASSAY TEST FOR BLOOD FECAL: CPT | Performed by: ADVANCED PRACTICE MIDWIFE

## 2019-07-02 DIAGNOSIS — Z12.11 SPECIAL SCREENING FOR MALIGNANT NEOPLASMS, COLON: ICD-10-CM

## 2019-07-02 LAB — HEMOCCULT STL QL IA: NEGATIVE

## 2019-07-03 NOTE — RESULT ENCOUNTER NOTE
Dear Ashanti,    Your test results are attached below. Your lab test result was normal and reassuring.  If you have any questions, please contact me via [email or web address] or you can call our office at [phone].    Jazlyn Florentino CNM, LINDA

## 2019-07-10 ENCOUNTER — ALLIED HEALTH/NURSE VISIT (OUTPATIENT)
Dept: NURSING | Facility: CLINIC | Age: 54
End: 2019-07-10
Payer: COMMERCIAL

## 2019-07-10 DIAGNOSIS — Z23 NEED FOR VACCINATION: Primary | ICD-10-CM

## 2019-07-10 PROCEDURE — 90471 IMMUNIZATION ADMIN: CPT

## 2019-07-10 PROCEDURE — 90715 TDAP VACCINE 7 YRS/> IM: CPT

## 2019-07-19 ENCOUNTER — ANCILLARY PROCEDURE (OUTPATIENT)
Dept: MAMMOGRAPHY | Facility: CLINIC | Age: 54
End: 2019-07-19
Attending: ADVANCED PRACTICE MIDWIFE
Payer: COMMERCIAL

## 2019-07-19 DIAGNOSIS — Z12.31 ENCOUNTER FOR SCREENING MAMMOGRAM FOR BREAST CANCER: ICD-10-CM

## 2019-10-05 ENCOUNTER — HEALTH MAINTENANCE LETTER (OUTPATIENT)
Age: 54
End: 2019-10-05

## 2020-07-15 NOTE — PROGRESS NOTES
"   SUBJECTIVE:   CC: Ashanti Yanes is an 54 year old woman who presents for preventive health visit.     Healthy Habits:    Do you get at least three servings of calcium containing foods daily (dairy, green leafy vegetables, etc.)? yes    Amount of exercise or daily activities, outside of work: 5 day(s) per week    Problems taking medications regularly No    Medication side effects: No    Have you had an eye exam in the past two years? yes    Do you see a dentist twice per year? yes    Do you have sleep apnea, excessive snoring or daytime drowsiness?no      {additional problems to add (Optional):432721}    Today's PHQ-2 Score:   PHQ-2 ( 1999 [employer]) 3/5/2013   Q1: Little interest or pleasure in doing things 0   Q2: Feeling down, depressed or hopeless 0   PHQ-2 Score 0       Abuse: Current or Past(Physical, Sexual or Emotional)- No  Do you feel safe in your environment? Yes        Social History     Tobacco Use     Smoking status: Never Smoker     Smokeless tobacco: Never Used   Substance Use Topics     Alcohol use: Yes     Alcohol/week: 0.8 - 2.5 standard drinks     Types: 1 - 3 Standard drinks or equivalent per week     Comment: occ     If you drink alcohol do you typically have >3 drinks per day or >7 drinks per week? No                     Reviewed orders with patient.  Reviewed health maintenance and updated orders accordingly - {Yes/No:581031::\"Yes\"}  {Chronicprobdata (Optional):996974}    {Mammo Decision Support (Optional):200932}    Pertinent mammograms are reviewed under the imaging tab.  History of abnormal Pap smear: {PAP HX:451621}  PAP / HPV Latest Ref Rng & Units 5/14/2019 3/17/2016 3/27/2015   PAP - NIL OTHER-NIL, See Result NIL   HPV 16 DNA NEG:Negative Negative Negative Negative   HPV 18 DNA NEG:Negative Negative Negative Negative   OTHER HR HPV NEG:Negative Negative Negative Positive(A)     Reviewed and updated as needed this visit by clinical staff         Reviewed and updated " "as needed this visit by Provider        {HISTORY OPTIONS (Optional):226440}    ROS:  { :269545}    OBJECTIVE:   LMP 01/05/2017 (Exact Date)   EXAM:  {Exam Choices:286742}    {Diagnostic Test Results (Optional):774575::\"Diagnostic Test Results:\",\"Labs reviewed in Epic\"}    ASSESSMENT/PLAN:   {Diag Picklist:334359}    COUNSELING:   {FEMALE COUNSELING MESSAGES:663180::\"Reviewed preventive health counseling, as reflected in patient instructions\"}    Estimated body mass index is 22.76 kg/m  as calculated from the following:    Height as of 5/14/19: 1.746 m (5' 8.75\").    Weight as of 5/14/19: 69.4 kg (153 lb).    {Weight Management Plan (ACO) Complete if BMI is abnormal-  Ages 18-64  BMI >24.9.  Age 65+ with BMI <23 or >30 (Optional):751102}     reports that she has never smoked. She has never used smokeless tobacco.  {Tobacco Cessation -- Complete if patient is a smoker (Optional):611885}    Counseling Resources:  ATP IV Guidelines  Pooled Cohorts Equation Calculator  Breast Cancer Risk Calculator  FRAX Risk Assessment  ICSI Preventive Guidelines  Dietary Guidelines for Americans, 2010  USDA's MyPlate  ASA Prophylaxis  Lung CA Screening    Chelly Canchola PA-C  [facility] [place]  "

## 2020-07-15 NOTE — PATIENT INSTRUCTIONS
Return for fasting labs  Get mammogram done  Schedule colonoscopry  Follow up with genetic counseling   Over the counter loratadine (Claritin) and flonase daily  Return to clinic for any new or worsening symptoms or go to ER Urgent care in off hours       Preventive Health Recommendations  Female Ages 50 - 64    Yearly exam: See your health care provider every year in order to  o Review health changes.   o Discuss preventive care.    o Review your medicines if your doctor has prescribed any.      Get a Pap test every three years (unless you have an abnormal result and your provider advises testing more often).    If you get Pap tests with HPV test, you only need to test every 5 years, unless you have an abnormal result.     You do not need a Pap test if your uterus was removed (hysterectomy) and you have not had cancer.    You should be tested each year for STDs (sexually transmitted diseases) if you're at risk.     Have a mammogram every 1 to 2 years.    Have a colonoscopy at age 50, or have a yearly FIT test (stool test). These exams screen for colon cancer.      Have a cholesterol test every 5 years, or more often if advised.    Have a diabetes test (fasting glucose) every three years. If you are at risk for diabetes, you should have this test more often.     If you are at risk for osteoporosis (brittle bone disease), think about having a bone density scan (DEXA).    Shots: Get a flu shot each year. Get a tetanus shot every 10 years.    Nutrition:     Eat at least 5 servings of fruits and vegetables each day.    Eat whole-grain bread, whole-wheat pasta and brown rice instead of white grains and rice.    Get adequate Calcium and Vitamin D.     Lifestyle    Exercise at least 150 minutes a week (30 minutes a day, 5 days a week). This will help you control your weight and prevent disease.    Limit alcohol to one drink per day.    No smoking.     Wear sunscreen to prevent skin cancer.     See your dentist every six  months for an exam and cleaning.    See your eye doctor every 1 to 2 years.

## 2020-07-16 ENCOUNTER — OFFICE VISIT (OUTPATIENT)
Dept: FAMILY MEDICINE | Facility: CLINIC | Age: 55
End: 2020-07-16
Payer: COMMERCIAL

## 2020-07-16 VITALS
HEIGHT: 69 IN | BODY MASS INDEX: 22.59 KG/M2 | SYSTOLIC BLOOD PRESSURE: 112 MMHG | HEART RATE: 82 BPM | DIASTOLIC BLOOD PRESSURE: 62 MMHG | WEIGHT: 152.5 LBS | TEMPERATURE: 96.4 F | OXYGEN SATURATION: 98 %

## 2020-07-16 DIAGNOSIS — E55.9 VITAMIN D DEFICIENCY: ICD-10-CM

## 2020-07-16 DIAGNOSIS — Z12.11 SCREENING FOR COLON CANCER: ICD-10-CM

## 2020-07-16 DIAGNOSIS — Z82.49 FAMILY HISTORY OF ISCHEMIC HEART DISEASE: ICD-10-CM

## 2020-07-16 DIAGNOSIS — Z12.39 SCREENING FOR BREAST CANCER: ICD-10-CM

## 2020-07-16 DIAGNOSIS — Z13.1 SCREENING FOR DIABETES MELLITUS: ICD-10-CM

## 2020-07-16 DIAGNOSIS — Z00.00 ROUTINE GENERAL MEDICAL EXAMINATION AT A HEALTH CARE FACILITY: Primary | ICD-10-CM

## 2020-07-16 DIAGNOSIS — Z80.41 FAMILY HISTORY OF MALIGNANT NEOPLASM OF OVARY: ICD-10-CM

## 2020-07-16 PROCEDURE — 99213 OFFICE O/P EST LOW 20 MIN: CPT | Mod: 25 | Performed by: PHYSICIAN ASSISTANT

## 2020-07-16 PROCEDURE — 99386 PREV VISIT NEW AGE 40-64: CPT | Performed by: PHYSICIAN ASSISTANT

## 2020-07-16 ASSESSMENT — MIFFLIN-ST. JEOR: SCORE: 1370.73

## 2020-07-16 NOTE — PROGRESS NOTES
SUBJECTIVE:   CC: Ashanti Yanes is an 54 year old woman who presents for preventive health visit.     Healthy Habits:    Do you get at least three servings of calcium containing foods daily (dairy, green leafy vegetables, etc.)? yes    Amount of exercise or daily activities, outside of work: 5 day(s) per week    Problems taking medications regularly No    Medication side effects: No    Have you had an eye exam in the past two years? yes    Do you see a dentist twice per year? yes    Do you have sleep apnea, excessive snoring or daytime drowsiness?no      Dad passed away a.t 60 due to MI  Also had DM 2  Brother  at heart attack at 44  Another brother had a mini stroke  Another brother had a A-fib    Also reports having chronic issues with her sinuses and ears.   Get sinus headaches, often worse in the cold weather.  Ears feel clogged, rhinorrhea  Over the counter antihistamines make her drowsy  Currently uses neti pot    Today's PHQ-2 Score:   PHQ-2 (  [employer]) 2020 3/5/2013   Q1: Little interest or pleasure in doing things 0 0   Q2: Feeling down, depressed or hopeless 0 0   PHQ-2 Score 0 0       Abuse: Current or Past(Physical, Sexual or Emotional)- No  Do you feel safe in your environment? Yes        Social History     Tobacco Use     Smoking status: Never Smoker     Smokeless tobacco: Never Used   Substance Use Topics     Alcohol use: Yes     Alcohol/week: 0.8 - 2.5 standard drinks     Types: 1 - 3 Standard drinks or equivalent per week     Comment: occ     If you drink alcohol do you typically have >3 drinks per day or >7 drinks per week? No                     Reviewed orders with patient.  Reviewed health maintenance and updated orders accordingly - Yes  Lab work is in process  Labs reviewed in EPIC  BP Readings from Last 3 Encounters:   20 112/62   19 113/69   18 94/66    Wt Readings from Last 3 Encounters:   20 69.2 kg (152 lb 8 oz)   19 69.4 kg  (153 lb)   05/01/18 68.2 kg (150 lb 4.8 oz)                  Patient Active Problem List   Diagnosis     Family history of other cardiovascular diseases     Encounter for other general counseling or advice on contraception     CARDIOVASCULAR SCREENING; LDL GOAL LESS THAN 130     Dislocation of patella, left, closed     Dense breasts     Family history of ischemic heart disease     Family history of malignant neoplasm of ovary     Past Surgical History:   Procedure Laterality Date     C APPENDECTOMY  1986    acute       Social History     Tobacco Use     Smoking status: Never Smoker     Smokeless tobacco: Never Used   Substance Use Topics     Alcohol use: Yes     Alcohol/week: 0.8 - 2.5 standard drinks     Types: 1 - 3 Standard drinks or equivalent per week     Comment: occ     Family History   Problem Relation Age of Onset     C.A.D. Father         MI  age 59     Diabetes Father      Cerebrovascular Disease Maternal Grandfather      Cancer Maternal Grandmother         ovarian     C.A.D. Paternal Grandfather         MI     Lipids Mother         resolved     Other - See Comments Paternal Grandmother         reumatic fever     Cerebrovascular Disease Brother      Heart Disease Brother      Alcohol/Drug Brother         ETOH     Cardiovascular Brother      Heart Disease Brother         right exterior septal myocardial fibrosis/arteriosclorsis     Gynecology Maternal Aunt         ovarian cysts     Gynecology Maternal Aunt         uterine tumor           Mammogram Screening: Patient over age 50, mutual decision to screen reflected in health maintenance.    Pertinent mammograms are reviewed under the imaging tab.  History of abnormal Pap smear: NO - age 30-65 PAP every 5 years with negative HPV co-testing recommended  PAP / HPV Latest Ref Rng & Units 5/14/2019 3/17/2016 3/27/2015   PAP - NIL OTHER-NIL, See Result NIL   HPV 16 DNA NEG:Negative Negative Negative Negative   HPV 18 DNA NEG:Negative Negative Negative Negative  "  OTHER HR HPV NEG:Negative Negative Negative Positive(A)     Reviewed and updated as needed this visit by clinical staff  Tobacco  Allergies  Meds  Med Hx  Surg Hx  Fam Hx  Soc Hx        Reviewed and updated as needed this visit by Provider            ROS:  CONSTITUTIONAL: NEGATIVE for fever, chills, change in weight  INTEGUMENTARY/SKIN: NEGATIVE for worrisome rashes, moles or lesions  EYES: NEGATIVE for vision changes or irritation  ENT: NEGATIVE for hoarseness, sinus pressure, tooth pain and vertigo  RESP: NEGATIVE for significant cough or SOB  BREAST: NEGATIVE for masses, tenderness or discharge  CV: NEGATIVE for chest pain, palpitations or peripheral edema  GI: NEGATIVE for nausea, abdominal pain, heartburn, or change in bowel habits  : NEGATIVE for unusual urinary or vaginal symptoms. No vaginal bleeding.  MUSCULOSKELETAL: NEGATIVE for significant arthralgias or myalgia  NEURO: NEGATIVE for weakness, dizziness or paresthesias  ENDOCRINE: NEGATIVE for temperature intolerance, skin/hair changes  HEME/ALLERGY/IMMUNE: NEGATIVE for bleeding problems  PSYCHIATRIC: NEGATIVE for changes in mood or affect     OBJECTIVE:   /62   Pulse 82   Temp 96.4  F (35.8  C) (Temporal)   Ht 1.76 m (5' 9.29\")   Wt 69.2 kg (152 lb 8 oz)   LMP 01/05/2017 (Exact Date)   SpO2 98%   BMI 22.33 kg/m    EXAM:  GENERAL APPEARANCE: healthy, alert and no distress  EYES: Eyes grossly normal to inspection, PERRL and conjunctivae and sclerae normal  HENT: ear canals and TM's normal, nose and mouth without ulcers or lesions, oropharynx clear and oral mucous membranes moist  NECK: no adenopathy, no asymmetry, masses, or scars and thyroid normal to palpation  RESP: lungs clear to auscultation - no rales, rhonchi or wheezes  BREAST: normal without masses, tenderness or nipple discharge and no palpable axillary masses or adenopathy  CV: regular rate and rhythm, normal S1 S2, no S3 or S4, no murmur, click or rub, no peripheral edema " and peripheral pulses strong  ABDOMEN: soft, nontender, no hepatosplenomegaly, no masses and bowel sounds normal  MS: no musculoskeletal defects are noted and gait is age appropriate without ataxia  SKIN: no suspicious lesions or rashes  NEURO: Normal strength and tone, sensory exam grossly normal, mentation intact and speech normal  PSYCH: mentation appears normal and affect normal/bright    Diagnostic Test Results:  No results found for any visits on 07/16/20.    ASSESSMENT/PLAN:       ICD-10-CM    1. Routine general medical examination at a health care facility  Z00.00    2. Screening for colon cancer  Z12.11 COLORECTAL SURGERY REFERRAL   3. Screening for diabetes mellitus  Z13.1 **Glucose FUTURE 2mo     CANCELED: GLUCOSE   4. Screening for breast cancer  Z12.39 CANCELED: *MA Screening Digital Bilateral   5. Family history of malignant neoplasm of ovary  Z80.41 CANCER RISK MGMT/CANCER GENETIC COUNSELING REFERRAL   6. Family history of ischemic heart disease  Z82.49 Lipid panel reflex to direct LDL Fasting     CRP cardiac risk     CANCELED: Lipid panel reflex to direct LDL Fasting   7. Vitamin D deficiency  E55.9 Vitamin D Deficiency       Very strong family history of heart disease, therefore will update labs. Follow up with genetic counseling for strong history of ovarian cancer. Return to clinic for any new or worsening symptoms or go to ER Urgent care in off hours       Patient Instructions   Return for fasting labs  Get mammogram done  Schedule colonoscopry  Follow up with genetic counseling   Over the counter loratadine (Claritin) and flonase daily  Return to clinic for any new or worsening symptoms or go to ER Urgent care in off hours       Preventive Health Recommendations  Female Ages 50 - 64    Yearly exam: See your health care provider every year in order to  o Review health changes.   o Discuss preventive care.    o Review your medicines if your doctor has prescribed any.      Get a Pap test every  "three years (unless you have an abnormal result and your provider advises testing more often).    If you get Pap tests with HPV test, you only need to test every 5 years, unless you have an abnormal result.     You do not need a Pap test if your uterus was removed (hysterectomy) and you have not had cancer.    You should be tested each year for STDs (sexually transmitted diseases) if you're at risk.     Have a mammogram every 1 to 2 years.    Have a colonoscopy at age 50, or have a yearly FIT test (stool test). These exams screen for colon cancer.      Have a cholesterol test every 5 years, or more often if advised.    Have a diabetes test (fasting glucose) every three years. If you are at risk for diabetes, you should have this test more often.     If you are at risk for osteoporosis (brittle bone disease), think about having a bone density scan (DEXA).    Shots: Get a flu shot each year. Get a tetanus shot every 10 years.    Nutrition:     Eat at least 5 servings of fruits and vegetables each day.    Eat whole-grain bread, whole-wheat pasta and brown rice instead of white grains and rice.    Get adequate Calcium and Vitamin D.     Lifestyle    Exercise at least 150 minutes a week (30 minutes a day, 5 days a week). This will help you control your weight and prevent disease.    Limit alcohol to one drink per day.    No smoking.     Wear sunscreen to prevent skin cancer.     See your dentist every six months for an exam and cleaning.    See your eye doctor every 1 to 2 years.        COUNSELING:   Reviewed preventive health counseling, as reflected in patient instructions    Estimated body mass index is 22.33 kg/m  as calculated from the following:    Height as of this encounter: 1.76 m (5' 9.29\").    Weight as of this encounter: 69.2 kg (152 lb 8 oz).         reports that she has never smoked. She has never used smokeless tobacco.      Counseling Resources:  ATP IV Guidelines  Pooled Cohorts Equation " Calculator  Breast Cancer Risk Calculator  FRAX Risk Assessment  ICSI Preventive Guidelines  Dietary Guidelines for Americans, 2010  Viki's MyPlate  ASA Prophylaxis  Lung CA Screening    Chelly Canchola PA-C  [facility]

## 2020-07-22 NOTE — TELEPHONE ENCOUNTER
ONCOLOGY INTAKE: Records Information      APPT INFORMATION:  Referring provider:  Chelly Canchola PA-C  Referring provider s clinic:  [facility]  Reason for visit/diagnosis:  Family history of malignant neoplasm of ovary [Z80.41]  Has patient been notified of appointment date and time?: Yes    RECORDS INFORMATION:  Were the records received with the referral (via [employer])? No,Internal Referral      Has patient been seen for any external appt for this diagnosis? No    If yes, where? NA    ADDITIONAL INFORMATION:  None

## 2020-07-23 ENCOUNTER — ANCILLARY PROCEDURE (OUTPATIENT)
Dept: MAMMOGRAPHY | Facility: CLINIC | Age: 55
End: 2020-07-23
Attending: PHYSICIAN ASSISTANT
Payer: COMMERCIAL

## 2020-07-23 DIAGNOSIS — Z12.39 SCREENING FOR BREAST CANCER: ICD-10-CM

## 2020-08-03 DIAGNOSIS — Z12.11 SCREENING FOR COLON CANCER: Primary | ICD-10-CM

## 2020-08-12 ENCOUNTER — VIRTUAL VISIT (OUTPATIENT)
Dept: ONCOLOGY | Facility: CLINIC | Age: 55
End: 2020-08-12
Attending: PHYSICIAN ASSISTANT
Payer: COMMERCIAL

## 2020-08-12 ENCOUNTER — PRE VISIT (OUTPATIENT)
Dept: ONCOLOGY | Facility: CLINIC | Age: 55
End: 2020-08-12

## 2020-08-12 DIAGNOSIS — Z80.8 FAMILY HISTORY OF MALIGNANT NEOPLASM OF BRAIN: ICD-10-CM

## 2020-08-12 DIAGNOSIS — Z80.42 FAMILY HISTORY OF PROSTATE CANCER: ICD-10-CM

## 2020-08-12 DIAGNOSIS — Z80.41 FAMILY HISTORY OF OVARIAN CANCER: Primary | ICD-10-CM

## 2020-08-12 PROCEDURE — 96040 ZZH GENETIC COUNSELING, EACH 30 MINUTES: CPT | Mod: 95,ZF | Performed by: GENETIC COUNSELOR, MS

## 2020-08-12 NOTE — PROGRESS NOTES
"8/12/2020    Referring Provider: Chelly Canchola PA-C    Ashanti Yanes is a 54 year old female who is being evaluated via a billable video visit.      The patient has been notified of following:   \"This video visit will be conducted via a call between you and your provider. We have found that certain health care needs can be provided without the need for an in-person physical exam.  This service lets us provide the care you need with a video conversation. If lab work is needed we can place an order for that and you can then stop by our lab to have the test done at a later time.    Video visits are billed at different rates depending on your insurance coverage.  Please reach out to your insurance provider with any questions. If during the course of the call the provider feels a video visit is not appropriate, you will not be charged for this service.\"    Patient has given verbal consent for Video visit? YES  Will anyone else be joining your video visit? NO    Video-Visit Details  Type of service:  Video Visit  Video Start Time: 2:15 p.m.  Video End Time (time video stopped): 3:15  Originating Location (pt. Location): Home  Distant Location (provider location):  [facility]   Mode of Communication:  Video Conference via [employer]    Presenting Information:   I spoke with Ashanti Yanes today for a video telehealth visit for genetic counseling to discuss her family history of ovarian and prostate cancer. We reviewed her personal and family history, discussed cancer screening recommendations, and went over available genetic testing options.    Personal History:  Ashanti is a 54 year old female. She does not have any personal history of cancer.      She had her first menstrual period at age 12-13, does not have biological children, and is postmenopausal (52). Ashanti has her ovaries, fallopian tubes and uterus in place, and she has not had ovarian cancer screening to date. She has a " history of normal PAP smears. She reports about 20 years of oral contraceptive use and she has not had hormone replacement therapy.      She has had clinical breast exams and mammograms; her most recent mammogram on 7- was negative/normal; her breast tissue was categorized as extremely dense.     Ashanti has not had a colonoscopy. She does not regularly do any other cancer screening at this time.     Family History: (Please see scanned pedigree for detailed family history information)    Her niece (age 30) through a brother had a recurrent benign ovarian tumor; she recently had an ovary removed.   Maternal    Her mother had a benign breast lump removed and a preventive total abdominal hysterectomy/bilateral salpingo-oophorectomy (KAIT/BSO) in her 50's. She is currently 83 with no personal history of cancer.    Her maternal uncle had prostate cancer in his 70's; he is currently 84.     Her maternal aunt had ovarian cysts; she is currently 89    Another maternal aunt had a benign vaginal/uterine tumor; she is currently 88.     Her maternal grandmother had ovarian cancer at 66-67; she passed away at 71.     Her great-grandmother through her maternal grandfather passed away from a gynecological cancer (unknown if ovarian or uterine).   Paternal    A paternal first cousin through her paternal uncle passed away from a malignant brain tumor at 63 (pathology unknown).     Another paternal first cousin through a paternal aunt passed away in his 60's from an unknown type of cancer. There are no other known cancers on her paternal side.   Note:    Of note, there is a significant history of vascular conditions and heart attacks on her paternal side of the family:    Her brother passed away from a heart attack at 44    Another brother (58) has atrial fibrillation    Another brother (62) had a minor stroke in his 50's    Her father passed away from a heart attack at 60    Her paternal uncle (92) had a heart attack     A  paternal first cousin passed away from a stroke in her 40's    Her paternal grandfather had a heart attack and passed away at 48      Her maternal ethnicity is [place] and [place]. Her paternal ethnicity is [place] and [place].  There is no known Ashkenazi Gnosticist ancestry on either side of her family. There is no reported consanguinity.    Discussion:    We reviewed the features of sporadic, familial, and hereditary cancers. In looking at Ashanti's family history, it is possible that a cancer susceptibility gene is present due to her grandmother's ovarian cancer and her uncle's prostate cancer.     Without knowing the pathology of her paternal first cousin's brain tumor or the type of cancer her other paternal cousin  from, a risk assessment was unable to be made today. If Ashanti finds out more information about these relatives, a more accurate risk assessment can be made in the future.      We discussed the natural history and genetics of Hereditary Breast and Ovarian Cancer (HBOC), which is caused by a mutation in the BRCA1 or BRCA2 gene.  HBOC typically presents with multiple family members diagnosed with breast cancer before age 50 and/or ovarian cancer. Other cancer risks associated with HBOC include male breast cancer, prostate cancer, pancreatic cancer, and melanoma. Based on her personal and family history, Ashanti meets current National Comprehensive Cancer Network (NCCN) criteria for genetic testing of BRCA1/2.      We also discussed Thomson syndrome, which can be caused by a mutation in one of five genes:  MLH1, MSH2, MSH6, PMS2, and EPCAM. A single mutation in one of the Thomson Syndrome genes increases the risk for several cancers, such as colon, endometrial, ovarian, and stomach.  Other cancers that can be seen in some families with Thomson Syndrome include pancreatic, bladder, biliary tract, urothelial, small bowel, prostate, breast and brain cancers.    A detailed handout regarding BRCA1/2, Thomson  syndrome, and the information we discussed will be provided to Ashanti in the mail and can be found in the after visit summary. Topics included: inheritance pattern, cancer risks, cancer screening recommendations, and also risks, benefits and limitations of testing.    We discussed that there are additional genes that could cause increased risk for ovarian and prostate cancer. As many of these genes present with overlapping features in a family and accurate cancer risk cannot always be established based upon the pedigree analysis alone, it would be reasonable for Ashanti to consider panel genetic testing to analyze multiple genes at once.    We reviewed genetic testing options for the cancers seen in her family history that suggest a hereditary cause: an actionable high/moderate risk gene custom panel and an expanded high and moderate risk custom panel. Ashanti expressed that she would like to consider the actionable high/moderate risk gene custom panel.    Of note, Ashanti opted to wait to have genetic testing and would like a pre-verification for the BRCANext-Expanded panel testing via [employer]. We discussed that I will submit her insurance information to [employer] and will call her in a week or two with the estimated out-of-pocket cost. At that time, if she decides to proceed with genetic testing, we will go over the consent form and I will order a saliva kit to be sent to her home address. Ashanti verbalized understanding of the pre-verification process.     Medical Management: For Ashanti, we reviewed that the information from future genetic testing may determine:    additional cancer screening for which Ashanti may qualify (i.e., mammogram and breast MRI, more frequent colonoscopies, more frequent dermatologic exams, etc.),    options for risk reducing surgeries Ashanti could consider (i.e., bilateral mastectomy, surgery to remove her ovaries and/or uterus, etc.),      and targeted chemotherapies if she were to develop  certain cancers in the future (i.e. immunotherapy for individuals with Thomson syndrome, PARP inhibitors, etc.).     These recommendations and possible targeted chemotherapies will be discussed in detail if genetic testing is completed.     Plan:  1) Today Ashanti decided to have a pre-verification for BRCANext-Expanded panel offered by [facility].  2) I will call her with the estimate in a week or two and will leave a general voicemail.   3) At the time of the call, Ashanti will let me know if she would like to proceed with testing.   4) Due to the strong paternal family history of heart conditions, Ashanti could consider making an appointment with cardiology genetic counselor Monica Vasquez at [phone].     Time spent on the video visit: 60 minutes    Mariza Henson MS, [facility] [place]ensed, certified genetic counselor  [phone]

## 2020-08-12 NOTE — LETTER
"    8/12/2020         RE: Ashanti Yanes  [address]  [facility] [address]        Dear Colleague,    Thank you for referring your patient, Ashanti Yanes, to the [facility]. Please see a copy of my visit note below.    8/12/2020    Referring Provider: Chelly Canchola PA-C    Ashanti Yanes is a 54 year old female who is being evaluated via a billable video visit.      The patient has been notified of following:   \"This video visit will be conducted via a call between you and your provider. We have found that certain health care needs can be provided without the need for an in-person physical exam.  This service lets us provide the care you need with a video conversation. If lab work is needed we can place an order for that and you can then stop by our lab to have the test done at a later time.    Video visits are billed at different rates depending on your insurance coverage.  Please reach out to your insurance provider with any questions. If during the course of the call the provider feels a video visit is not appropriate, you will not be charged for this service.\"    Patient has given verbal consent for Video visit? YES  Will anyone else be joining your video visit? NO    Video-Visit Details  Type of service:  Video Visit  Video Start Time: 2:15 p.m.  Video End Time (time video stopped): 3:15  Originating Location (pt. Location): Home  Distant Location (provider location):  [facility]   Mode of Communication:  Video Conference via [employer]    Presenting Information:   I spoke with Ashanti Yanes today for a video telehealth visit for genetic counseling to discuss her family history of ovarian and prostate cancer. We reviewed her personal and family history, discussed cancer screening recommendations, and went over available genetic testing options.    Personal History:  Ashanti is a 54 year old female. " She does not have any personal history of cancer.      She had her first menstrual period at age 12-13, does not have biological children, and is postmenopausal (52). Ashanti has her ovaries, fallopian tubes and uterus in place, and she has not had ovarian cancer screening to date. She has a history of normal PAP smears. She reports about 20 years of oral contraceptive use and she has not had hormone replacement therapy.      She has had clinical breast exams and mammograms; her most recent mammogram on 7- was negative/normal; her breast tissue was categorized as extremely dense.     Ashanti has not had a colonoscopy. She does not regularly do any other cancer screening at this time.     Family History: (Please see scanned pedigree for detailed family history information)    Her niece (age 30) through a brother had a recurrent benign ovarian tumor; she recently had an ovary removed.   Maternal    Her mother had a benign breast lump removed and a preventive total abdominal hysterectomy/bilateral salpingo-oophorectomy (KAIT/BSO) in her 50's. She is currently 83 with no personal history of cancer.    Her maternal uncle had prostate cancer in his 70's; he is currently 84.     Her maternal aunt had ovarian cysts; she is currently 89    Another maternal aunt had a benign vaginal/uterine tumor; she is currently 88.     Her maternal grandmother had ovarian cancer at 66-67; she passed away at 71.     Her great-grandmother through her maternal grandfather passed away from a gynecological cancer (unknown if ovarian or uterine).   Paternal    A paternal first cousin through her paternal uncle passed away from a malignant brain tumor at 63 (pathology unknown).     Another paternal first cousin through a paternal aunt passed away in his 60's from an unknown type of cancer. There are no other known cancers on her paternal side.   Note:    Of note, there is a significant history of vascular conditions and heart attacks on her  paternal side of the family:    Her brother passed away from a heart attack at 44    Another brother (58) has atrial fibrillation    Another brother (62) had a minor stroke in his 50's    Her father passed away from a heart attack at 60    Her paternal uncle (92) had a heart attack     A paternal first cousin passed away from a stroke in her 40's    Her paternal grandfather had a heart attack and passed away at 48      Her maternal ethnicity is Polish and Urdu. Her paternal ethnicity is Urdu and [place]ien.  There is no known Ashkenazi Baptism ancestry on either side of her family. There is no reported consanguinity.    Discussion:    We reviewed the features of sporadic, familial, and hereditary cancers. In looking at Ashanti's family history, it is possible that a cancer susceptibility gene is present due to her grandmother's ovarian cancer and her uncle's prostate cancer.     Without knowing the pathology of her paternal first cousin's brain tumor or the type of cancer her other paternal cousin  from, a risk assessment was unable to be made today. If Ashanti finds out more information about these relatives, a more accurate risk assessment can be made in the future.      We discussed the natural history and genetics of Hereditary Breast and Ovarian Cancer (HBOC), which is caused by a mutation in the BRCA1 or BRCA2 gene.  HBOC typically presents with multiple family members diagnosed with breast cancer before age 50 and/or ovarian cancer. Other cancer risks associated with HBOC include male breast cancer, prostate cancer, pancreatic cancer, and melanoma. Based on her personal and family history, Ashanti meets current National Comprehensive Cancer Network (NCCN) criteria for genetic testing of BRCA1/2.      We also discussed Thomson syndrome, which can be caused by a mutation in one of five genes:  MLH1, MSH2, MSH6, PMS2, and EPCAM. A single mutation in one of the Thomson Syndrome genes increases the risk for several  cancers, such as colon, endometrial, ovarian, and stomach.  Other cancers that can be seen in some families with Thomson Syndrome include pancreatic, bladder, biliary tract, urothelial, small bowel, prostate, breast and brain cancers.    A detailed handout regarding BRCA1/2, Thomson syndrome, and the information we discussed will be provided to Ashanti in the mail and can be found in the after visit summary. Topics included: inheritance pattern, cancer risks, cancer screening recommendations, and also risks, benefits and limitations of testing.    We discussed that there are additional genes that could cause increased risk for ovarian and prostate cancer. As many of these genes present with overlapping features in a family and accurate cancer risk cannot always be established based upon the pedigree analysis alone, it would be reasonable for Ashanti to consider panel genetic testing to analyze multiple genes at once.    We reviewed genetic testing options for the cancers seen in her family history that suggest a hereditary cause: an actionable high/moderate risk gene custom panel and an expanded high and moderate risk custom panel. Ashanti expressed that she would like to consider the actionable high/moderate risk gene custom panel.    Of note, Ashanti opted to wait to have genetic testing and would like a pre-verification for the BRCANext-Expanded panel testing via [facility]. We discussed that I will submit her insurance information to [employer] and will call her in a week or two with the estimated out-of-pocket cost. At that time, if she decides to proceed with genetic testing, we will go over the consent form and I will order a saliva kit to be sent to her home address. Ashanti verbalized understanding of the pre-verification process.     Medical Management: For Ashanti, we reviewed that the information from future genetic testing may determine:    additional cancer screening for which Ashanti may qualify (i.e., mammogram and  breast MRI, more frequent colonoscopies, more frequent dermatologic exams, etc.),    options for risk reducing surgeries Ashanti could consider (i.e., bilateral mastectomy, surgery to remove her ovaries and/or uterus, etc.),      and targeted chemotherapies if she were to develop certain cancers in the future (i.e. immunotherapy for individuals with Thomson syndrome, PARP inhibitors, etc.).     These recommendations and possible targeted chemotherapies will be discussed in detail if genetic testing is completed.     Plan:  1) Today Ashanti decided to have a pre-verification for BRCANext-Expanded panel offered by [facility].  2) I will call her with the estimate in a week or two and will leave a general voicemail.   3) At the time of the call, Ashanti will let me know if she would like to proceed with testing.   4) Due to the strong paternal family history of heart conditions, Ashanti could consider making an appointment with cardiology genetic counselor Monica Vasquez at [phone].     Time spent on the video visit: 60 minutes    Mariza Henson MS, [facility] [place]ensed[place] certified genetic counselor  [phone]        Again, thank you for allowing me to participate in the care of your patient.        Sincerely,        JAY Henson GC

## 2020-08-12 NOTE — LETTER
Cancer Risk Management  Program Locations    [facility]  [facility]  [facility]  [facility]  Mailing Address  Cancer Risk Management Program  [facility]  [address]  [facility] [address]  [place] [address]    New patient appointments  [phone]  August 12, 2020     Ashanti Yanes  [address]  [facility] [address]      Dear Ashanti,    It was a pleasure speaking with you via video visit on 8-. Here is a copy of the progress note from our discussion. If you have any additional questions, please feel free to call.    Referring Provider: Chelly Canchola PA-C    Ashanti Yanes is a 54 year old female who is being evaluated via a billable video visit.          Video-Visit Details  Type of service:  Video Visit  Video Start Time: 2:15 p.m.  Video End Time (time video stopped): 3:15  Originating Location (pt. Location): Home  Distant Location (provider location):  [facility]   Mode of Communication:  Video Conference via dough    Presenting Information:   I spoke with Ashanti Yanes today for a video telehealth visit for genetic counseling to discuss her family history of ovarian and prostate cancer. We reviewed her personal and family history, discussed cancer screening recommendations, and went over available genetic testing options.    Personal History:  Ashanti is a 54 year old female. She does not have any personal history of cancer.      She had her first menstrual period at age 12-13, does not have biological children, and is postmenopausal (52). Ashanti has her ovaries, fallopian tubes and uterus in place, and she has not had ovarian cancer screening to date. She has a history of normal PAP smears. She reports about 20 years of oral contraceptive use and she has not had hormone  replacement therapy.      She has had clinical breast exams and mammograms; her most recent mammogram on 7- was negative/normal; her breast tissue was categorized as extremely dense.  Ashanti has not had a colonoscopy. She does not regularly do any other cancer screening at this time.     Family History: (Please see scanned pedigree for detailed family history information)    Her niece (age 30) through a brother had a recurrent benign ovarian tumor; she recently had an ovary removed.   Maternal    Her mother had a benign breast lump removed and a preventive total abdominal hysterectomy/bilateral salpingo-oophorectomy (KAIT/BSO) in her 50's. She is currently 83 with no personal history of cancer.    Her maternal uncle had prostate cancer in his 70's; he is currently 84.     Her maternal aunt had ovarian cysts; she is currently 89    Another maternal aunt had a benign vaginal/uterine tumor; she is currently 88.     Her maternal grandmother had ovarian cancer at 66-67; she passed away at 71.     Her great-grandmother through her maternal grandfather passed away from a gynecological cancer (unknown if ovarian or uterine).   Paternal    A paternal first cousin through her paternal uncle passed away from a malignant brain tumor at 63 (pathology unknown).     Another paternal first cousin through a paternal aunt passed away in his 60's from an unknown type of cancer. There are no other known cancers on her paternal side.   Note:    Of note, there is a significant history of vascular conditions and heart attacks on her paternal side of the family:    Her brother passed away from a heart attack at 44    Another brother (58) has atrial fibrillation    Another brother (62) had a minor stroke in his 50's    Her father passed away from a heart attack at 60    Her paternal uncle (92) had a heart attack     A paternal first cousin passed away from a stroke in her 40's    Her paternal grandfather had a heart attack and passed  away at 48      Her maternal ethnicity is [place] and [place]. Her paternal ethnicity is [place] and [place] citizen [place].  There is no known Ashkenazi Orthodox ancestry on either side of her family. There is no reported consanguinity.    Discussion:    We reviewed the features of sporadic, familial, and hereditary cancers. In looking at Ashanti's family history, it is possible that a cancer susceptibility gene is present due to her grandmother's ovarian cancer and her uncle's prostate cancer.     Without knowing the pathology of her paternal first cousin's brain tumor or the type of cancer her other paternal cousin  from, a risk assessment was unable to be made today. If Ashanti finds out more information about these relatives, a more accurate risk assessment can be made in the future.      We discussed the natural history and genetics of Hereditary Breast and Ovarian Cancer (HBOC), which is caused by a mutation in the BRCA1 or BRCA2 gene.  HBOC typically presents with multiple family members diagnosed with breast cancer before age 50 and/or ovarian cancer. Other cancer risks associated with HBOC include male breast cancer, prostate cancer, pancreatic cancer, and melanoma. Based on her personal and family history, Ashanti meets current National Comprehensive Cancer Network (NCCN) criteria for genetic testing of BRCA1/2.      We also discussed Thomson syndrome, which can be caused by a mutation in one of five genes:  MLH1, MSH2, MSH6, PMS2, and EPCAM. A single mutation in one of the Thomson Syndrome genes increases the risk for several cancers, such as colon, endometrial, ovarian, and stomach.  Other cancers that can be seen in some families with Thomson Syndrome include pancreatic, bladder, biliary tract, urothelial, small bowel, prostate, breast and brain cancers.    A detailed handout regarding BRCA1/2, Thomson syndrome, and the information we discussed will be provided to Ashanti in the mail and can be found in the after visit summary.  Topics included: inheritance pattern, cancer risks, cancer screening recommendations, and also risks, benefits and limitations of testing.    We discussed that there are additional genes that could cause increased risk for ovarian and prostate cancer. As many of these genes present with overlapping features in a family and accurate cancer risk cannot always be established based upon the pedigree analysis alone, it would be reasonable for Ashanti to consider panel genetic testing to analyze multiple genes at once.    We reviewed genetic testing options for the cancers seen in her family history that suggest a hereditary cause: an actionable high/moderate risk gene custom panel and an expanded high and moderate risk custom panel. Ashanti expressed that she would like to consider the actionable high/moderate risk gene custom panel.    Of note, Ashanti opted to wait to have genetic testing and would like a pre-verification for the BRCANext-Expanded panel testing via [facility]. We discussed that I will submit her insurance information to [employer] and will call her in a week or two with the estimated out-of-pocket cost. At that time, if she decides to proceed with genetic testing, we will go over the consent form and I will order a saliva kit to be sent to her home address. Ashanti verbalized understanding of the pre-verification process.     Medical Management: For Ashanti, we reviewed that the information from future genetic testing may determine:    additional cancer screening for which Ashanti may qualify (i.e., mammogram and breast MRI, more frequent colonoscopies, more frequent dermatologic exams, etc.),    options for risk reducing surgeries Ashanti could consider (i.e., bilateral mastectomy, surgery to remove her ovaries and/or uterus, etc.),      and targeted chemotherapies if she were to develop certain cancers in the future (i.e. immunotherapy for individuals with Thomson syndrome, PARP inhibitors, etc.).     These  recommendations and possible targeted chemotherapies will be discussed in detail if genetic testing is completed.         Plan:  1) Today Ashanti decided to have a pre-verification for BRCANext-Expanded panel offered by [facility].  2) I will call her with the estimate in a week or two and will leave a general voicemail.   3) At the time of the call, Ashanti will let me know if she would like to proceed with testing.   4) Due to the strong paternal family history of heart conditions, Ashanti could consider making an appointment with cardiology genetic counselor Monica Vasquez at [phone].     Mariza Henson MS, [facility]  Licensed, certified genetic counselor  [phone]                                                                                        Assessing Cancer Risk  Only about 5-10% of cancers are thought to be due to an inherited cancer susceptibility gene.    These families often have:    Several people with the same or related types of cancer    Cancers diagnosed at a young age (before age 50)    Individuals with more than one primary cancer    Multiple generations of the family affected with cancer    Some people may be candidates for genetic testing of more than one gene.  For these families, genetic testing using a cancer panel may be offered.  These panels will test different genes known to increase the risk for breast, ovarian, uterine, and/or other cancers. All of the genes discussed below have published clinical management guidelines for individuals who are found to carry a mutation. The purpose of this handout is to serve as a brief summary of the genes analyzed by the panels used to inquire about hereditary breast and gynecologic cancer:  KATIA, BRCA1, BRCA2, BRIP1, CDH1, CHEK2, MLH1, MSH2, MSH6, PMS2, EPCAM, PTEN, PALB2, RAD51C, RAD51D, and TP53.  ______________________________________________________________________________  Hereditary Breast and Ovarian Cancer Syndrome   (BRCA1 and BRCA2)  A single  mutation in one of the copies of BRCA1 or BRCA2 increases the risk for breast and ovarian cancer, among others.  The risk for pancreatic cancer and melanoma may also be slightly increased in some families.  The chart below shows the chance that someone with a BRCA mutation would develop cancer in his or her lifetime1,2,3,4.        A person s ethnic background is also important to consider, as individuals of Ashkenazi Adventism ancestry have a higher chance of having a BRCA gene mutation.  There are three BRCA mutations that occur more frequently in this population.    Thomson Syndrome   (MLH1, MSH2, MSH6, PMS2, and EPCAM)  Currently five genes are known to cause Thomson Syndrome: MLH1, MSH2, MSH6, PMS2, and EPCAM.  A single mutation in one of the Thomson Syndrome genes increases the risk for colon, endometrial, ovarian, and stomach cancers.  Other cancers that occur less commonly in Thomson Syndrome include urinary tract, skin, and brain cancers.  The chart below shows the chance that a person with Thomson syndrome would develop cancer in his or her lifetime5.      *Cancer risk varies depending on Thomson syndrome gene found    Cowden Syndrome   (PTEN)  Cowden syndrome is a hereditary condition that increases the risk for breast, thyroid, endometrial, colon, and kidney cancer.  Cowden syndrome is caused by a mutation in the PTEN gene.  A single mutation in one of the copies of PTEN causes Cowden syndrome and increases cancer risk.  The chart below shows the chance that someone with a PTEN mutation would develop cancer in their lifetime6,7.  Other benign features seen in some individuals with Cowden syndrome include benign skin lesions (facial papules, keratoses, lipomas), learning disability, autism, thyroid nodules, colon polyps, and larger head size.      *One recent study found breast cancer risk to be increased to 85%    Li-Fraumeni Syndrome   (TP53)  Li-Fraumeni Syndrome (LFS) is a cancer predisposition syndrome caused by a  mutation in the TP53 gene. A single mutation in one of the copies of TP53 increases the risk for multiple cancers. Individuals with LFS are at an increased risk for developing cancer at a young age. The lifetime risk for development of a LFS-associated cancer is 50% by age 30 and 90% by age 60.   Core Cancers: Sarcomas, Breast, Brain, Lung, Leukemias/Lymphomas, Adrenocortical carcinomas  Other Cancers: Gastrointestinal, Thyroid, Skin, Genitourinary    Hereditary Diffuse Gastric Cancer   (CDH1)  Currently, one gene is known to cause hereditary diffuse gastric cancer (HDGC): CDH1.  Individuals with HDGC are at increased risk for diffuse gastric cancer and lobular breast cancer. Of people diagnosed with HDGC, 30-50% have a mutation in the CDH1 gene.  This suggests there are likely other genes that may cause HDGC that have not been identified yet.      Lifetime Cancer Risks    General Population HDGC    Diffuse Gastric  <1% ~80%   Breast 12% 39-52%         Additional Genes  KATIA  KATIA is a moderate-risk breast cancer gene. Women who have a mutation in KATIA can have between a 2-4 fold increased risk for breast cancer compared to the general population8. KATIA mutations have also been associated with increased risk for pancreatic cancer, however an estimate of this cancer risk is not well understood9. Individuals who inherit two KATIA mutations have a condition called ataxia-telangiectasia (AT).  This rare autosomal recessive condition affects the nervous system and immune system, and is associated with progressive cerebellar ataxia beginning in childhood.  Individuals with ataxia-telangiectasia often have a weakened immune system and have an increased risk for childhood cancers.    PALB2  Mutations in PALB2 have been shown to increase the risk of breast cancer up to 33-58% in some families; where individuals fall within this risk range is dependent upon family ggobalt97. PALB2 mutations have also been associated with increased  risk for pancreatic cancer, although this risk has not been quantified yet.  Individuals who inherit two PALB2 mutations--one from their mother and one from their father--have a condition called Fanconi Anemia.  This rare autosomal recessive condition is associated with short stature, developmental delay, bone marrow failure, and increased risk for childhood cancers.    CHEK2   CHEK2 is a moderate-risk breast cancer gene.  Women who have a mutation in CHEK2 have around a 2-fold increased risk for breast cancer compared to the general population, and this risk may be higher depending upon family history.11,12,13 Mutations in CHEK2 have also been shown to increase the risk of a number of other cancers, including colon and prostate, however these cancer risks are currently not well understood.    BRIP1, RAD51C and RAD51D  Mutations in BRIP1, RAD51C, and RAD51D have been shown to increase the risk of ovarian cancer and possibly female breast cancer as well14,15 .       Lifetime Cancer Risk    General Population BRIP1 RAD51C RAD51D   Ovarian 1-2% ~5-8% ~5-9% ~7-15%           Inheritance  All of the cancer syndromes reviewed above are inherited in an autosomal dominant pattern.  This means that if a parent has a mutation, each of his or her children will have a 50% chance of inheriting that same mutation.  Therefore, each child--male or female--would have a 50% chance of being at increased risk for developing cancer.      Image obtained from Genetics Home Reference, 2013     Mutations in some genes can occur de cleveland, which means that a person s mutation occurred for the first time in them and was not inherited from a parent.  Now that they have the mutation, however, it can be passed on to future generations.    Genetic Testing  Genetic testing involves a blood test and will look at the genetic information in the KATIA, BRCA1, BRCA2, BRIP1, CDH1, CHEK2, MLH1, MSH2, MSH6, PMS2, EPCAM, PTEN, PALB2, RAD51C, RAD51D, and TP53  genes for any harmful mutations that are associated with increased cancer risk.  If possible, it is recommended that the person(s) who has had cancer be tested before other family members.  That person will give us the most useful information about whether or not a specific gene is associated with the cancer in the family.    Results  There are three possible results of genetic testing:    Positive--a harmful mutation was identified in one or more of the genes    Negative--no mutation was identified in any of the genes on this panel    Variant of unknown significance--a variation in one of the genes was identified, but it is unclear how this impacts cancer risk in the family    Advantages and Disadvantages   There are advantages and disadvantages to genetic testing.    Advantages    May clarify your cancer risk    Can help you make medical decisions    May explain the cancers in your family    May give useful information to your family members (if you share your results)    Disadvantages    Possible negative emotional impact of learning about inherited cancer risk    Uncertainty in interpreting a negative test result in some situations    Possible genetic discrimination concerns (see below)    Genetic Information Nondiscrimination Act (HANH)  HANH is a federal law that protects individuals from health insurance or employment discrimination based on a genetic test result alone.  Although rare, there are currently no legal discrimination protections in terms of life insurance, long term care, or disability insurances.  Visit the National Human [employer] [place] website to learn more.    Reducing Cancer Risk  All of the genes described above have nationally recognized cancer screening guidelines that would be recommended for individuals who test positive.  In addition to increased cancer screening, surgeries may be offered or recommended to reduce cancer risk.  Recommendations are based upon an individual s  genetic test result as well as their personal and family history of cancer.    Questions to Think About Regarding Genetic Testing:    What effect will the test result have on me and my relationship with my family members if I have an inherited gene mutation?  If I don t have a gene mutation?    Should I share my test results, and how will my family react to this news, which may also affect them?    Are my children ready to learn new information that may one day affect their own health?    Hereditary Cancer Resources    FORCE: Facing Our Risk of Cancer Empowered facingourrisk.org   Bright Pink bebrightpink.org   Li-Fraumeni Syndrome Association lfsassociation.org   PTEN World PTENworld.[email or web address]   No stomach for cancer, Inc. nostomachforcancer.org   Stomach cancer relief network Scrnet.org   Collaborative Group of the Americas on Inherited Colorectal Cancer (CGA) cgaicc.com    Cancer Care cancercare.org   American Cancer Society (ACS) cancer.org   National Cancer [place] (NCI) cancer.gov     Please call us if you have any questions or concerns.   Cancer Risk Management Program [phone] ([phone])  ? Tomas Galloway, MS, [facility] [phone]  ? Annabelle Younger, MS, [facility]  [phone]  ? Vijaya Savage, MS, [facility]  [phone]  ? Saniya Schmitt, MS, [facility] [phone]  ? Ciara Yap, MS, [facility] [phone]  ? Mariza Henson, MS, [facility] [phone]  ? Sara Vasquez, MS, [facility]  [phone]    References  1. Naga Reyes PDP, Teresa S, Zeb GAITAN, Larry JE, Ana JL, Ellie N, Oscar H, Kiesha O, Kylie A, Jeanie B, Surjit P, Clare S, Duyen DM, Khari N, Blaze E, Tyler H, Law E, Lily J, Curt J, Jimmy B, Tulini H, Thorlacius S, Eerola H, Prakash H, Daily K, Kallioniemi OP. Average risks of breast and ovarian cancer associated with BRCA1 or BRCA2 mutations detected in case series unselected for family history: a combined analysis of 222 studies. Am J Hum Kathy. 2003;72:1117-30.  2. Windy Suero,  Daphne BEASLEY.  BRCA1 and BRCA2 Hereditary Breast and Ovarian Cancer. Gene Reviews online. 2013.  3. Juanito YC, Kath S, Rosa G, Reeder S. Breast cancer risk among male BRCA1 and BRCA2 mutation carriers. J Natl Cancer Inst. 2007;99:1811-4.  4. Mitch MCALLISTER, Blaise I, Afshin J, Jean E, Cody ER, Last F. Risk of breast cancer in male BRCA2 carriers. J Med Kathy. 2010;47:710-1.  5. National Comprehensive Cancer Network. Clinical practice guidelines in oncology, colorectal cancer screening. Available online (registration required). 2015.  6. Davis MH, Merly J, Kaylee J, Carly LA, Orkayleen MS, Eng C. Lifetime cancer risks in individuals with germline PTEN mutations. Clin Cancer Res. 2012;18:400-7.  7. Terry MICHEL. Cowden Syndrome: A Critical Review of the Clinical Literature. J Kathy . 2009:18:13-27.  8. Jairo A, Winston D, Munira S, Ciera P, Marion T, Valentina M, Tam B, Isacc H, Dinh R, Kristy K, Khushboo L, Mitch MCALLISTER, Duyen D, David DF, Grace MR, The Breast Cancer Susceptibility Collaboration (UK) & Tamia NAILS. KATIA mutations that cause ataxia-telangiectasia are breast cancer susceptibility alleles. Nature Genetics. 2006;38:873-875  9. Boby N , Gina Y, Christen J, Marbin L, Cecil GM , Ahmet ML, Gallinger S, Farias AG, Syngal S, Esther ML, Sheila J , Edwin R, Anup SZ, Arthur JR, Yunier VE, Bindu M, Vogelstein B, Andrew N, Mary RH, Benigno KW, and Morales AP. KATIA mutations in patients with hereditary pancreatic cancer. Cancer Discover. 2012;2:41-46  10. Keren CAMPBELL, et al. Breast-Cancer Risk in Families with Mutations in PALB2. NEJM. 2014; 371(6):497-506.  11. CHEK2 Breast Cancer Case-Control Consortium. CHEK2*1100delC and susceptibility to breast cancer: A collaborative analysis involving 10,860 breast cancer cases and 9,065 controls from 10 studies. Am J Hum Kathy, 74 (2004), pp. 8059-3527  12. Silverio ROBB, Griselda S, Linda DOYLE, et al. Spectrum of Mutations in BRCA1, BRCA2, CHEK2,  and TP53 in Families at High Risk of Breast Cancer. GENNY. 2006;295(12):3307-5064.   13. Ching C, Michelle D, Luanne A, et al. Risk of breast cancer in women with a CHEK2 mutation with and without a family history of breast cancer. J Clin Oncol. 2011;29:2907-6692.  14. Ricky H, Shea E, Dhaval SJ, et al. Contribution of germline mutations in the RAD51B, RAD51C, and RAD51D genes to ovarian cancer in the population. J Clin Oncol. 2015;33(26):6466-8885. Doi:10.1200/JCO.2015.61.2408.  15. Matheus T, Liset DF, Bree P, et al. Mutations in BRIP1 confer high risk of ovarian cancer. Vicki Kathy. 2011;43(11):2671-6789. doi:10.1038/ng.955.

## 2020-08-28 ENCOUNTER — TELEPHONE (OUTPATIENT)
Dept: ONCOLOGY | Facility: CLINIC | Age: 55
End: 2020-08-28

## 2020-08-28 NOTE — TELEPHONE ENCOUNTER
I left a brief voicemail for Ashanti Nicole Joaquín today regarding insurance updates. I saw Ashanti on 8/12/2020 because of a family history of ovarian and prostate cancer.    I left a brief, general voicemail to call me back regarding insurance estimates. I encouraged Ashanti to contact me with any further questions at [phone].    Mariza Henson MS, [facility] [place]  Licensed, certified genetic counselor  [phone]

## 2020-09-10 ENCOUNTER — TELEPHONE (OUTPATIENT)
Dept: GASTROENTEROLOGY | Facility: CLINIC | Age: 55
End: 2020-09-10

## 2020-09-10 NOTE — TELEPHONE ENCOUNTER
Returned pt call. LM for patient to call back. No order in chart, patient will have to call PCP to get order before scheduling.

## 2020-09-11 DIAGNOSIS — Z11.59 ENCOUNTER FOR SCREENING FOR OTHER VIRAL DISEASES: Primary | ICD-10-CM

## 2020-09-16 ENCOUNTER — TELEPHONE (OUTPATIENT)
Dept: ONCOLOGY | Facility: CLINIC | Age: 55
End: 2020-09-16

## 2020-09-16 NOTE — TELEPHONE ENCOUNTER
I returned a call Ashanti Herreraross Yanes today regarding insurance updates. I spoke with Ashanti for genetic counseling on 8- because of a family history of ovarian and prostate cancer.    We discussed today that the preverification for BRCANext-Expanded came back at $0 with insurance authorization. I explained that next steps were to consider if she would like to proceed and then go over the consent form. Ashanti asked if I could send her a PDF of the consent form to her email. I sent the PDF to her email while on the phone. Ashanti said if she had questions or would like to proceed with testing she would contact me via email or phone.     I encouraged Ashanti to contact me with any further questions at [phone].    Mariza Henson MS, [facility] [place]  Licensed, certified genetic counselor  [phone]

## 2020-09-17 DIAGNOSIS — Z13.1 SCREENING FOR DIABETES MELLITUS: ICD-10-CM

## 2020-09-17 DIAGNOSIS — E55.9 VITAMIN D DEFICIENCY: ICD-10-CM

## 2020-09-17 DIAGNOSIS — Z82.49 FAMILY HISTORY OF ISCHEMIC HEART DISEASE: ICD-10-CM

## 2020-09-17 LAB
CHOLEST SERPL-MCNC: 222 MG/DL
CRP SERPL HS-MCNC: 0.3 MG/L
DEPRECATED CALCIDIOL+CALCIFEROL SERPL-MC: 23 UG/L (ref 20–75)
GLUCOSE SERPL-MCNC: 82 MG/DL (ref 70–99)
HDLC SERPL-MCNC: 68 MG/DL
LDLC SERPL CALC-MCNC: 138 MG/DL
NONHDLC SERPL-MCNC: 154 MG/DL
TRIGL SERPL-MCNC: 78 MG/DL

## 2020-09-17 PROCEDURE — 36415 COLL VENOUS BLD VENIPUNCTURE: CPT | Performed by: PHYSICIAN ASSISTANT

## 2020-09-17 PROCEDURE — 82306 VITAMIN D 25 HYDROXY: CPT | Performed by: PHYSICIAN ASSISTANT

## 2020-09-17 PROCEDURE — 80061 LIPID PANEL: CPT | Performed by: PHYSICIAN ASSISTANT

## 2020-09-17 PROCEDURE — 86141 C-REACTIVE PROTEIN HS: CPT | Performed by: PHYSICIAN ASSISTANT

## 2020-09-17 PROCEDURE — 82947 ASSAY GLUCOSE BLOOD QUANT: CPT | Performed by: PHYSICIAN ASSISTANT

## 2020-09-18 DIAGNOSIS — Z11.59 ENCOUNTER FOR SCREENING FOR OTHER VIRAL DISEASES: ICD-10-CM

## 2020-09-18 PROCEDURE — U0003 INFECTIOUS AGENT DETECTION BY NUCLEIC ACID (DNA OR RNA); SEVERE ACUTE RESPIRATORY SYNDROME CORONAVIRUS 2 (SARS-COV-2) (CORONAVIRUS DISEASE [COVID-19]), AMPLIFIED PROBE TECHNIQUE, MAKING USE OF HIGH THROUGHPUT TECHNOLOGIES AS DESCRIBED BY CMS-2020-01-R: HCPCS | Performed by: INTERNAL MEDICINE

## 2020-09-19 LAB
SARS-COV-2 RNA SPEC QL NAA+PROBE: NOT DETECTED
SPECIMEN SOURCE: NORMAL

## 2020-09-22 ENCOUNTER — HOSPITAL ENCOUNTER (OUTPATIENT)
Facility: AMBULATORY SURGERY CENTER | Age: 55
End: 2020-09-22
Attending: INTERNAL MEDICINE
Payer: COMMERCIAL

## 2020-09-22 VITALS
HEART RATE: 57 BPM | RESPIRATION RATE: 16 BRPM | OXYGEN SATURATION: 100 % | HEIGHT: 69 IN | TEMPERATURE: 97.2 F | BODY MASS INDEX: 21.92 KG/M2 | WEIGHT: 148 LBS | SYSTOLIC BLOOD PRESSURE: 107 MMHG | DIASTOLIC BLOOD PRESSURE: 65 MMHG

## 2020-09-22 LAB — COLONOSCOPY: NORMAL

## 2020-09-22 RX ORDER — FLUMAZENIL 0.1 MG/ML
0.2 INJECTION, SOLUTION INTRAVENOUS
Status: CANCELLED | OUTPATIENT
Start: 2020-09-22 | End: 2020-09-22

## 2020-09-22 RX ORDER — ONDANSETRON 2 MG/ML
4 INJECTION INTRAMUSCULAR; INTRAVENOUS EVERY 6 HOURS PRN
Status: CANCELLED | OUTPATIENT
Start: 2020-09-22

## 2020-09-22 RX ORDER — LIDOCAINE 40 MG/G
CREAM TOPICAL
Status: DISCONTINUED | OUTPATIENT
Start: 2020-09-22 | End: 2020-09-23 | Stop reason: HOSPADM

## 2020-09-22 RX ORDER — ONDANSETRON 2 MG/ML
4 INJECTION INTRAMUSCULAR; INTRAVENOUS ONCE
Status: COMPLETED | OUTPATIENT
Start: 2020-09-22 | End: 2020-09-22

## 2020-09-22 RX ORDER — ONDANSETRON 4 MG/1
4 TABLET, ORALLY DISINTEGRATING ORAL EVERY 6 HOURS PRN
Status: CANCELLED | OUTPATIENT
Start: 2020-09-22

## 2020-09-22 RX ORDER — FENTANYL CITRATE 50 UG/ML
INJECTION, SOLUTION INTRAMUSCULAR; INTRAVENOUS PRN
Status: DISCONTINUED | OUTPATIENT
Start: 2020-09-22 | End: 2020-09-22 | Stop reason: HOSPADM

## 2020-09-22 RX ORDER — NALOXONE HYDROCHLORIDE 0.4 MG/ML
.1-.4 INJECTION, SOLUTION INTRAMUSCULAR; INTRAVENOUS; SUBCUTANEOUS
Status: CANCELLED | OUTPATIENT
Start: 2020-09-22 | End: 2020-09-23

## 2020-09-22 RX ORDER — SIMETHICONE
LIQUID (ML) MISCELLANEOUS PRN
Status: DISCONTINUED | OUTPATIENT
Start: 2020-09-22 | End: 2020-09-22 | Stop reason: HOSPADM

## 2020-09-22 RX ORDER — ONDANSETRON 2 MG/ML
4 INJECTION INTRAMUSCULAR; INTRAVENOUS
Status: DISCONTINUED | OUTPATIENT
Start: 2020-09-22 | End: 2020-09-23 | Stop reason: HOSPADM

## 2020-09-22 RX ADMIN — ONDANSETRON 4 MG: 2 INJECTION INTRAMUSCULAR; INTRAVENOUS at 09:50

## 2020-09-22 ASSESSMENT — MIFFLIN-ST. JEOR: SCORE: 1345.7

## 2020-09-22 NOTE — PROGRESS NOTES
Patient had vaso vagal episode about 5 minutes after PIV was placed. She did lose consciousness for about 10 seconds. Placed on pulse oximeter and BP taken. Notified Dr. Lopez who gave orders to start fluids and give Zofran. Meds given. Continuing to monitor patient in room.

## 2020-09-24 LAB — COPATH REPORT: NORMAL

## 2020-11-14 ENCOUNTER — HEALTH MAINTENANCE LETTER (OUTPATIENT)
Age: 55
End: 2020-11-14

## 2020-12-28 ENCOUNTER — ALLIED HEALTH/NURSE VISIT (OUTPATIENT)
Dept: FAMILY MEDICINE | Facility: CLINIC | Age: 55
End: 2020-12-28
Payer: COMMERCIAL

## 2020-12-28 DIAGNOSIS — Z23 NEED FOR PROPHYLACTIC VACCINATION AND INOCULATION AGAINST INFLUENZA: Primary | ICD-10-CM

## 2020-12-28 PROCEDURE — 90471 IMMUNIZATION ADMIN: CPT

## 2020-12-28 PROCEDURE — 99207 PR NO CHARGE NURSE ONLY: CPT

## 2020-12-28 PROCEDURE — 90682 RIV4 VACC RECOMBINANT DNA IM: CPT

## 2021-01-04 ENCOUNTER — TELEPHONE (OUTPATIENT)
Dept: ONCOLOGY | Facility: CLINIC | Age: 56
End: 2021-01-04
Payer: COMMERCIAL

## 2021-01-04 NOTE — TELEPHONE ENCOUNTER
1/4/2021    Referring Provider: Chelly Canchola PA-C    Presenting Information:   I spoke with Ashanti Yanes today over the phone for a follow-up visit. She had previously been seen on 8/12/2020 for a genetic counseling video visit because of a family history of ovarian and prostate cancer. I had submitted a preverification for BRCANext-Expanded test through [facility]. The estimated out-of-pocket cost is $0 with insurance authorization for 2020. Ashanti let me know today that new insurance enrollment starts in March 2021 so the 2020 benefit analysis should still be valid.   Ashanti approved this cost and decided to proceed with genetic testing using a saliva kit from [facility].     We discussed that this estimate is not a guarantee of it being $0 as her insurance needs to still approve everything. If for some reason insurance denies the actual test once we order it, then she would be offered the self-pay option of $249 or could apply for the Patient Assistance Program. Ashanti verbalized understanding of this.     Discussion:  We previously reviewed the details of Ashanti's family and personal history. Please see the clinic note from our previous appointment for details.   Genetic testing is available for 23 genes associated with hereditary gynecologic, breast, and related cancers: BRCANext-Expanded (KATIA, BARD1, BRCA1, BRCA2, BRIP1, CDH1, CHEK2, DICER1, EPCAM, MLH1, MSH2, MSH6, NBN, NF1, PALB2, PMS2, PTEN, RAD51C, RAD51D, RECQL, SMARCA4, STK11, TP53).  We discussed that some of the genes in the BRCANext-Expanded panel are associated with specific hereditary cancer syndromes and published management guidelines: Hereditary Breast and Ovarian Cancer syndrome (BRCA1, BRCA2), Thomson syndrome (MLH1, MSH2, MSH6, PMS2, EPCAM), Hereditary Diffuse Gastric Cancer (CDH1), Cowden syndrome (PTEN), Li Fraumeni syndrome (TP53), Peutz-Jeghers syndrome (STK11), and Neurofibromatosis type 1 (NF1).    Risk-reducing  salpingo-oophorectomy can be considered in women with mutations in BRIP1, RAD51C, or RAD51D. Breast and/or other cancer risk management guidelines are available for KATIA, CHEK2, PALB2, NF1, and NBN.  The remaining genes (BARD1, DICER1, RECQL, and SMARCA4) are associated with increased cancer risk and may allow us to make medical recommendations when mutations are identified.      Ashanti gave verbal consent over the phone due to COVID-19 restrictions for genetic testing via [facility]. Turnaround time: 4-5 weeks.    Due to COVID-19 restrictions, saliva kits can be sent from [facility] to patients. They will receive a kit directly to their home with directions on how to collect a saliva sample. We discussed that the success of the testing depends on how well she follows the directions and that there is a small chance for sample failure. Patients should sign the patient signature card and place a label on their collection tube. Ashanti verbalized understanding of this. Once the sample is collected, she will send it to [facility] using the return envelope and prepaid shipping label.     Medical Management: For Ashanti, we reviewed that the information from genetic testing may determine:    additional cancer screening for which Ashanti may qualify (i.e., mammogram and breast MRI, more frequent colonoscopies, more frequent dermatologic exams, etc.),    options for risk reducing surgeries Ashanti could consider (i.e., bilateral mastectomy, surgery to remove her ovaries and/or uterus, etc.),      and targeted chemotherapies for Ashanti if she were to develop certain cancers in the future (i.e. immunotherapy for individuals with Thomson syndrome, PARP inhibitors, etc.).   These recommendations and possible targeted chemotherapies will be discussed in detail once genetic testing is completed.     Plan:  1) Today Ashanti elected to proceed with genetic testing using the BRCANext-Expanded panel offered by [facility].  2) A copy of the  consent form was sent by email.   3) Once the saliva sample is received by [facility], results should be available in about 4-5 weeks.   4) We will discuss Ashanti's results virtually due to COVID-19 restrictions.    Time spent on the phone: 15 minutes    Mariza Henson MS, [facility] [place]  Licensed, certified genetic counselor  [phone]

## 2021-01-12 DIAGNOSIS — Z80.8 FAMILY HISTORY OF MALIGNANT NEOPLASM OF BRAIN: ICD-10-CM

## 2021-01-12 DIAGNOSIS — Z80.42 FAMILY HISTORY OF PROSTATE CANCER: ICD-10-CM

## 2021-01-12 DIAGNOSIS — Z80.41 FAMILY HISTORY OF OVARIAN CANCER: ICD-10-CM

## 2021-01-21 LAB — MISCELLANEOUS TEST: NORMAL

## 2021-02-11 ENCOUNTER — TELEPHONE (OUTPATIENT)
Dept: ONCOLOGY | Facility: CLINIC | Age: 56
End: 2021-02-11
Payer: COMMERCIAL

## 2021-02-11 NOTE — Clinical Note
Please send a copy of this letter to the patient and include a copy of their genetic testing results: Order: [id number].     Thank you,  Mariza Henson MS, [facility] [place]  Licensed, certified genetic counselor

## 2021-02-11 NOTE — TELEPHONE ENCOUNTER
"2/11/2021     Referring Provider: Chelly Canchola PA-C     Presenting Information:  I spoke with Ashanti today virtually to discuss her genetic testing results. Ashanti submitted a saliva sample and BRCANext-Expanded test was ordered from [facility]. This testing was done because of her family history of ovarian and prostate cancer.     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 No other variants or mutations were found in the CHEK2 gene. Given the uncertain significance of this result, medical management decisions should NOT be made based on this test result alone.     Ashanti is negative for harmful, known mutations in the KATIA, BARD1, BRCA1, BRCA2, BRIP1, CDH1, CHEK2, DICER1, EPCAM, MLH1, MSH2, MSH6, NBN, NF1, PALB2, PMS2, PTEN, RAD51C, RAD51D, RECQL, SMARCA4, STK11, and TP53 genes. No pathogenic mutations were found in any of the 23 genes analyzed. This test involved sequencing and deletion/duplication analysis of all genes with the exceptions of EPCAM (deletions/duplications only).     Testing did not detect an identifiable mutation associated with Hereditary Breast and Ovarian Cancer syndrome (BRCA1, BRCA2), Thomson syndrome (MLH1, MSH2, MSH6, PMS2, EPCAM), Hereditary Diffuse Gastric Cancer (CDH1), Cowden syndrome (PTEN), Li Fraumeni syndrome (TP53), Peutz-Jeghers syndrome (STK11), or Neurofibromatosis type 1 (NF1).     A copy of the test report can be found in the Laboratory tab, dated 1/12/2021, and named \"[facility]\". The report is scanned in as a linked document.     Interpretation:  We discussed several different interpretations of this inconclusive test result. It is not clear if this variant in the CHEK2 gene is associated with increased cancer risk.  1. This variant may be a benign change that does not increase cancer risk.  2. This variant may be a " harmful mutation that causes a moderate risk for breast cancer and slight risk for colorectal cancer.     Pathogenic or disease-causing mutations in the CHEK2 gene are associated with a moderate risk of breast and colon cancer. The lifetime breast cancer risk for women who carry one CHEK2 mutation is approximately 15-40%, compared to the lifetime population risk of breast cancer of 12%. The risk of colon cancer may be twice as high (about 10%) compared to the general population risk of colon cancer of 5%.  Some studies have shown a possible association of CHEK2 mutations with increased risk for prostate, kidney, and other cancers, however data is still limited in this area. No confirmed risk numbers are available for these additional cancers, though they have been reported in families that have a CHEK2 mutation.     The laboratory is working to determine if this variant is harmful or benign, and they will contact us if it is reclassified. If this variant is determined to be a benign change, there may be a different gene or combination of genes and environment that are associated with the cancers in this family.     It is also important to consider that her maternal relatives may have had a mutation in one of the genes tested and she did not inherit it.       Inheritance:  We reviewed the autosomal dominant inheritance of this variant in the CHEK2 gene. Each of her siblings has a 50% risk of having the same variant. Because it is unclear what, if any, risk is associated with this variant, clinical genetic testing for this variant alone is not recommended for relatives.     Family Studies:  The laboratory may offer additional research based testing for specific relatives in order to help reclassify this variant.     Screening:  Based on this inconclusive test result, it is important for Ashanti and her relatives to refer back to the family history for appropriate cancer screening.    ? Due to Ashanti's family history of  ovarian cancer, Ashanti and other close female relatives of the family member who had ovarian cancer remain at slightly increased risk for ovarian cancer. We discussed available ovarian cancer screening (pelvic exams, CA-125 blood tests, and transvaginal ultrasounds) as well as the significant limitations of this screening. As such, this screening is not typically recommended. That being said, women in this family should discuss this screening and the signs and symptoms of ovarian cancer with their primary OB/GYN provider, as they may have individualized recommendations.  ? Due to Ashanti's family history of uterine or gynecological cancer, Ashanti and other close female relatives of the family member who had uterine cancer remain at slightly increased risk for uterine cancer. We discussed available uterine cancer screening (pelvic exams, endometrial sampling, transvaginal ultrasounds) as well as the significant limitations of this screening. As such, this screening is not typically recommended. That being said, women in this family should discuss their family history, this screening, and the signs and symptoms of uterine cancer with their primary OB/GYN provider, as they may have individualized recommendations.  ? Other population cancer screening options, such as those recommended by the American Cancer Society and the National Comprehensive Cancer Network (NCCN), are also appropriate for Ashanti and her family. These screening recommendations may change if there are changes to Ashanti's personal and/or family history of cancer. Final screening recommendations should be made by each individual's managing physician.     Additional Testing Considerations:  It is possible Ashanti does carry a currently identifiable gene or combination of genes and environment that increase her risk for cancer. There is an option of larger gene panels covering more moderate risk genes associated with brain tumors and prostate cancer. Ashanti is encouraged  to contact me if she wishes to readdress these larger gene panel options.     Although Ashanti's genetic testing result is inconclusive, other relatives may still carry a harmful gene mutation associated with prostate and ovarian cancer as well as brain tumors and brain cancer. Genetic counseling is recommended for her mother and maternal relatives and her niece and paternal relatives  to discuss genetic testing options. If any of these relatives do pursue genetic testing, Ashanti is encouraged to contact me so that we may review the impact of their test results on her.     Summary:  We do not have an explanation for Ashanti's family history of cancer. While no genetic changes were identified, Ashanti may still be at risk for certain cancers due to family history, environmental factors, or other genetic causes not identified by this test.  Because of that, it is important that she continue with cancer screening based on her personal and family history as discussed above.     Genetic testing is rapidly advancing, and new cancer susceptibility genes will most likely be identified in the future. Therefore, I encouraged Ashanti to contact me annually or if there are changes in her personal or family history. This may change how we assess her cancer risk, screening, and the testing we would offer.     Plan:  1.  A copy of her test results will be sent in the mail.    2.  She plans to follow-up with her medical providers as needed.  3.  She should contact us annually, or sooner if her family history changes.  4.  One of the genetic counselors on the team will contact Ashanti if the laboratory informs me that this VUS has been reclassified.  This may change screening and testing recommendations for Ashanti and her relatives.  5. Ashanti requested information about seeing a cardiology genetic counselor given the family history of heart problems in her family. At [facility], Monica Vasquez is the genetic counselor in the cardiology department and the  number to schedule an appointment is [phone].     If Ashanti has any further questions, I encouraged her to contact Tomas Galloway at [phone].     Time spent on the phone: 15 minutes     Mariza Henson MS, [facility] [place]  Licensed, certified genetic counselor  [facility]  Cancer Risk Management Program  [phone]                           Genetic Testing  You had a test that looked at the genetic information in one or more genes associated with increased cancer risk.  The testing looked for any harmful changes that would stop this particular gene from working like it should.  It is important to remember that everyone has variants in multiple genes in their bodies that do not affect how the gene works.  These changes are benign and do not cause disease or increase cancer risk.  The term often used to describe these variants is benign polymorphism.  If an individual is found to have a benign polymorphism, their genetic test result is reported as Negative.     Results  There are three possible results of any genetic test:    Positive--a harmful mutation was identified    Negative--no mutation was identified    Variant of unknown significance--a variation in one of the genes was identified, but it is unclear how this impacts cancer risk in the family     Variant of Unknown Significance (VUS)  A VUS was identified in your sample.  Scientists currently do not know if this variant is harmful or if it is a benign polymorphism not associated with any increased risk of cancer.   There are several reasons for this lack of knowledge, but likely your VUS has either never been seen before or has only been seen in a small number of individuals.  Until your VUS is studied in more detail and/or seen in more families, scientists are not able to predict if it is a harmful mutation or a benign polymorphism.  Therefore, the cause of the cancer in you and your relatives is still unknown.           Reclassification  Genetic testing  laboratories and researchers are constantly working to determine the importance of variants of unknown significance.  Most laboratories will notify the genetic counselor when a patient s VUS has been reclassified as a harmful mutation or a benign polymorphism.       Some families may be candidates for participation in the laboratory s variant research programs to help determine the importance of their VUS.  Only the testing laboratory can decide who is eligible for participation.  Participating in these programs does not guarantee that families will learn the significance of their VUS immediately.  It could be months or years before a VUS is reclassified.        Screening Recommendations  Cancer screening recommendations for patients with a VUS should be based on their personal and family history rather than the VUS itself.  This may include increased cancer screening for certain individuals in the family.  Your genetic counselor and health care provider can help make appropriate recommendations for you and your relatives.       It is usually not recommended that other relatives have genetic testing for the VUS unless it is done as part of the laboratory s variant research program because an individual s test results should not influence their cancer screening until we determine the importance of the VUS.  Your genetic counselor can help you and your relatives understand the risks and benefits of all genetic testing and cancer screening options.     Please call us if you have any questions or concerns.      Cancer Risk Management Program   [phone] ([phone])    Tomas Galloway, MS, [facility]      [phone]    Annabelle Younger, MS, [facility]            [phone]    Vijaya Savage, MS, [facility]            [phone]    Mariza Henson, MS, [facility]       [phone]    Saniya Schmitt, MS, [facility]       [phone]    Ciara Yap, MS, [facility]       [phone]    Sara Vasquez, MS, [facility]          [phone]

## 2021-02-11 NOTE — LETTER
Cancer Risk Management  Program Locations    [facility]  [facility]  [facility]  [facility]  Mailing Address  Cancer Risk Management Program  [facility]  [address]  [place] [address]    New patient appointments  [phone]  February 11, 2021    Ashanti Marieyoly  [address]  [facility] [address]      Dear Ashanti,    It was a pleasure speaking with you on the phone on 2-. Here is a copy of the progress note from our discussion. If you have any additional questions, please feel free to call.    Referring Provider: Chelly Canchola PA-C     Presenting Information:  I spoke with Ashanti today virtually to discuss her genetic testing results. Ashanti submitted a saliva sample and BRCANext-Expanded test was ordered from [facility]. This testing was done because of her family history of ovarian and prostate cancer.     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 No other variants or mutations were found in the CHEK2 gene. Given the uncertain significance of this result, medical management decisions should NOT be made based on this test result alone.     Ashanti is negative for harmful, known mutations in the KATIA, BARD1, BRCA1, BRCA2, BRIP1, CDH1, CHEK2, DICER1, EPCAM, MLH1, MSH2, MSH6, NBN, NF1, PALB2, PMS2, PTEN, RAD51C, RAD51D, RECQL, SMARCA4, STK11, and TP53 genes. No pathogenic mutations were found in any of the 23 genes analyzed. This test involved sequencing and deletion/duplication analysis of all genes with the exceptions of EPCAM (deletions/duplications only).     Testing did not detect an identifiable mutation associated with Hereditary Breast and Ovarian  Cancer syndrome (BRCA1, BRCA2), Thomson syndrome (MLH1, MSH2, MSH6, PMS2, EPCAM), Hereditary Diffuse Gastric Cancer (CDH1), Cowden syndrome (PTEN), Li Fraumeni syndrome (TP53), Peutz-Jeghers syndrome (STK11), or Neurofibromatosis type 1 (NF1).     Interpretation:  We discussed several different interpretations of this inconclusive test result. It is not clear if this variant in the CHEK2 gene is associated with increased cancer risk.  1. This variant may be a benign change that does not increase cancer risk.  2. This variant may be a harmful mutation that causes a moderate risk for breast cancer and slight risk for colorectal cancer.     Pathogenic or disease-causing mutations in the CHEK2 gene are associated with a moderate risk of breast and colon cancer. The lifetime breast cancer risk for women who carry one CHEK2 mutation is approximately 15-40%, compared to the lifetime population risk of breast cancer of 12%. The risk of colon cancer may be twice as high (about 10%) compared to the general population risk of colon cancer of 5%.  Some studies have shown a possible association of CHEK2 mutations with increased risk for prostate, kidney, and other cancers, however data is still limited in this area. No confirmed risk numbers are available for these additional cancers, though they have been reported in families that have a CHEK2 mutation.     The laboratory is working to determine if this variant is harmful or benign, and they will contact us if it is reclassified. If this variant is determined to be a benign change, there may be a different gene or combination of genes and environment that are associated with the cancers in this family.     It is also important to consider that her maternal relatives may have had a mutation in one of the genes tested and she did not inherit it.       Inheritance:  We reviewed the autosomal dominant inheritance of this variant in the CHEK2 gene. Each of her siblings has a 50% risk  of having the same variant. Because it is unclear what, if any, risk is associated with this variant, clinical genetic testing for this variant alone is not recommended for relatives.     Family Studies:  The laboratory may offer additional research based testing for specific relatives in order to help reclassify this variant.     Screening:  Based on this inconclusive test result, it is important for Ashanti and her relatives to refer back to the family history for appropriate cancer screening.    ? Due to Ashanti's family history of ovarian cancer, Ashanti and other close female relatives of the family member who had ovarian cancer remain at slightly increased risk for ovarian cancer. We discussed available ovarian cancer screening (pelvic exams, CA-125 blood tests, and transvaginal ultrasounds) as well as the significant limitations of this screening. As such, this screening is not typically recommended. That being said, women in this family should discuss this screening and the signs and symptoms of ovarian cancer with their primary OB/GYN provider, as they may have individualized recommendations.  ? Due to Ashanti's family history of uterine or gynecological cancer, Ashanti and other close female relatives of the family member who had uterine cancer remain at slightly increased risk for uterine cancer. We discussed available uterine cancer screening (pelvic exams, endometrial sampling, transvaginal ultrasounds) as well as the significant limitations of this screening. As such, this screening is not typically recommended. That being said, women in this family should discuss their family history, this screening, and the signs and symptoms of uterine cancer with their primary OB/GYN provider, as they may have individualized recommendations.  ? Other population cancer screening options, such as those recommended by the American Cancer Society and the National Comprehensive Cancer Network (NCCN), are also appropriate for Ashanti and  her family. These screening recommendations may change if there are changes to Ashanti's personal and/or family history of cancer. Final screening recommendations should be made by each individual's managing physician.     Additional Testing Considerations:  It is possible Ashanti does carry a currently identifiable gene or combination of genes and environment that increase her risk for cancer. There is an option of larger gene panels covering more moderate risk genes associated with brain tumors and prostate cancer. Ashanti is encouraged to contact me if she wishes to readdress these larger gene panel options.     Although Ashanti's genetic testing result is inconclusive, other relatives may still carry a harmful gene mutation associated with prostate and ovarian cancer as well as brain tumors and brain cancer. Genetic counseling is recommended for her mother and maternal relatives and her niece and paternal relatives  to discuss genetic testing options. If any of these relatives do pursue genetic testing, Ashanti is encouraged to contact me so that we may review the impact of their test results on her.     Summary:  We do not have an explanation for Ashanti's family history of cancer. While no genetic changes were identified, Ashanti may still be at risk for certain cancers due to family history, environmental factors, or other genetic causes not identified by this test.  Because of that, it is important that she continue with cancer screening based on her personal and family history as discussed above.     Genetic testing is rapidly advancing, and new cancer susceptibility genes will most likely be identified in the future. Therefore, I encouraged Ashanti to contact me annually or if there are changes in her personal or family history. This may change how we assess her cancer risk, screening, and the testing we would offer.     Plan:  1.  A copy of her test results will be sent in the mail.    2.  She plans to follow-up with her medical  providers as needed.  3.  She should contact us annually, or sooner if her family history changes.  4.  One of the genetic counselors on the team will contact Ashanti if the laboratory informs me that this VUS has been reclassified.  This may change screening and testing recommendations for Ashanti and her relatives.  5. Ashanti requested information about seeing a cardiology genetic counselor given the family history of heart problems in her family. At [facility], Monica Vasquez is the genetic counselor in the cardiology department and the number to schedule an appointment is [phone].     If Ashanti has any further questions, I encouraged her to contact Tomas Galloway at [phone].     Mariza Henson MS, [facility]  Licensed, certified genetic counselor  [facility]  Cancer Risk Management Program  [phone]                   Genetic Testing  You had a test that looked at the genetic information in one or more genes associated with increased cancer risk.  The testing looked for any harmful changes that would stop this particular gene from working like it should.  It is important to remember that everyone has variants in multiple genes in their bodies that do not affect how the gene works.  These changes are benign and do not cause disease or increase cancer risk.  The term often used to describe these variants is benign polymorphism.  If an individual is found to have a benign polymorphism, their genetic test result is reported as Negative.     Results  There are three possible results of any genetic test:    Positive--a harmful mutation was identified    Negative--no mutation was identified    Variant of unknown significance--a variation in one of the genes was identified, but it is unclear how this impacts cancer risk in the family     Variant of Unknown Significance (VUS)  A VUS was identified in your sample.  Scientists currently do not know if this variant is harmful or if it is a benign polymorphism not associated  with any increased risk of cancer.   There are several reasons for this lack of knowledge, but likely your VUS has either never been seen before or has only been seen in a small number of individuals.  Until your VUS is studied in more detail and/or seen in more families, scientists are not able to predict if it is a harmful mutation or a benign polymorphism.  Therefore, the cause of the cancer in you and your relatives is still unknown.           Reclassification  Genetic testing laboratories and researchers are constantly working to determine the importance of variants of unknown significance.  Most laboratories will notify the genetic counselor when a patient s VUS has been reclassified as a harmful mutation or a benign polymorphism.       Some families may be candidates for participation in the laboratory s variant research programs to help determine the importance of their VUS.  Only the testing laboratory can decide who is eligible for participation.  Participating in these programs does not guarantee that families will learn the significance of their VUS immediately.  It could be months or years before a VUS is reclassified.        Screening Recommendations  Cancer screening recommendations for patients with a VUS should be based on their personal and family history rather than the VUS itself.  This may include increased cancer screening for certain individuals in the family.  Your genetic counselor and health care provider can help make appropriate recommendations for you and your relatives.     It is usually not recommended that other relatives have genetic testing for the VUS unless it is done as part of the laboratory s variant research program because an individual s test results should not influence their cancer screening until we determine the importance of the VUS.  Your genetic counselor can help you and your relatives understand the risks and benefits of all genetic testing and cancer screening  options.     Please call us if you have any questions or concerns.      Cancer Risk Management Program   [phone] ([phone])    Tomas Galloway, MS, [facility]      [phone]    Annabelle Younger, MS, [facility]            [phone]    Vijaya Savage, MS, [facility]            [phone]    Mariza Henson, MS, [facility]       [phone]    Saniya Schmitt, MS, [facility]       [phone]    Ciara Yap, MS, [facility]       [phone]    Sara Vasquez, MS, [facility]          [phone]

## 2021-02-11 NOTE — Clinical Note
Please send a copy of this letter to the patient and include a copy of their genetic testing results: Order: [id number].     Thank you,  Mariza Henson MS, [facility]  Licensed, certified genetic counselor

## 2021-04-21 ENCOUNTER — IMMUNIZATION (OUTPATIENT)
Dept: NURSING | Facility: CLINIC | Age: 56
End: 2021-04-21
Payer: COMMERCIAL

## 2021-04-21 PROCEDURE — 91300 PR COVID VAC PFIZER DIL RECON 30 MCG/0.3 ML IM: CPT

## 2021-04-21 PROCEDURE — 0001A PR COVID VAC PFIZER DIL RECON 30 MCG/0.3 ML IM: CPT

## 2021-05-12 ENCOUNTER — IMMUNIZATION (OUTPATIENT)
Dept: NURSING | Facility: CLINIC | Age: 56
End: 2021-05-12
Attending: INTERNAL MEDICINE
Payer: COMMERCIAL

## 2021-05-12 PROCEDURE — 0002A PR COVID VAC PFIZER DIL RECON 30 MCG/0.3 ML IM: CPT

## 2021-05-12 PROCEDURE — 91300 PR COVID VAC PFIZER DIL RECON 30 MCG/0.3 ML IM: CPT

## 2021-09-12 ENCOUNTER — HEALTH MAINTENANCE LETTER (OUTPATIENT)
Age: 56
End: 2021-09-12

## 2021-11-07 ENCOUNTER — HEALTH MAINTENANCE LETTER (OUTPATIENT)
Age: 56
End: 2021-11-07

## 2021-11-11 ENCOUNTER — ANCILLARY PROCEDURE (OUTPATIENT)
Dept: MAMMOGRAPHY | Facility: CLINIC | Age: 56
End: 2021-11-11
Payer: COMMERCIAL

## 2021-11-11 DIAGNOSIS — Z12.31 VISIT FOR SCREENING MAMMOGRAM: ICD-10-CM

## 2021-11-11 PROCEDURE — 77063 BREAST TOMOSYNTHESIS BI: CPT | Mod: GC | Performed by: RADIOLOGY

## 2021-11-11 PROCEDURE — 77067 SCR MAMMO BI INCL CAD: CPT | Mod: GC | Performed by: RADIOLOGY

## 2022-01-11 ENCOUNTER — OFFICE VISIT (OUTPATIENT)
Dept: FAMILY MEDICINE | Facility: CLINIC | Age: 57
End: 2022-01-11
Payer: COMMERCIAL

## 2022-01-11 VITALS
SYSTOLIC BLOOD PRESSURE: 112 MMHG | RESPIRATION RATE: 18 BRPM | OXYGEN SATURATION: 98 % | DIASTOLIC BLOOD PRESSURE: 68 MMHG | TEMPERATURE: 98.5 F | WEIGHT: 163.5 LBS | BODY MASS INDEX: 24.78 KG/M2 | HEIGHT: 68 IN | HEART RATE: 67 BPM

## 2022-01-11 DIAGNOSIS — Z23 NEED FOR VACCINATION: ICD-10-CM

## 2022-01-11 DIAGNOSIS — N95.0 POSTMENOPAUSAL BLEEDING: ICD-10-CM

## 2022-01-11 DIAGNOSIS — R05.9 COUGH: ICD-10-CM

## 2022-01-11 DIAGNOSIS — Z11.59 ENCOUNTER FOR HEPATITIS C SCREENING TEST FOR LOW RISK PATIENT: Primary | ICD-10-CM

## 2022-01-11 DIAGNOSIS — E78.00 HYPERCHOLESTEROLEMIA: ICD-10-CM

## 2022-01-11 DIAGNOSIS — Z00.00 ENCOUNTER FOR ROUTINE ADULT HEALTH EXAMINATION WITHOUT ABNORMAL FINDINGS: ICD-10-CM

## 2022-01-11 LAB
CLUE CELLS: ABNORMAL
TRICHOMONAS, WET PREP: ABNORMAL
WBC'S/HIGH POWER FIELD, WET PREP: ABNORMAL
YEAST, WET PREP: ABNORMAL

## 2022-01-11 PROCEDURE — 99214 OFFICE O/P EST MOD 30 MIN: CPT | Mod: 25 | Performed by: FAMILY MEDICINE

## 2022-01-11 PROCEDURE — 90471 IMMUNIZATION ADMIN: CPT | Performed by: FAMILY MEDICINE

## 2022-01-11 PROCEDURE — 87210 SMEAR WET MOUNT SALINE/INK: CPT | Performed by: FAMILY MEDICINE

## 2022-01-11 PROCEDURE — 90632 HEPA VACCINE ADULT IM: CPT | Performed by: FAMILY MEDICINE

## 2022-01-11 PROCEDURE — 99396 PREV VISIT EST AGE 40-64: CPT | Mod: 25 | Performed by: FAMILY MEDICINE

## 2022-01-11 RX ORDER — FLUTICASONE PROPIONATE 50 MCG
1 SPRAY, SUSPENSION (ML) NASAL DAILY
Qty: 16 ML | Refills: 3 | Status: SHIPPED | OUTPATIENT
Start: 2022-01-11 | End: 2023-04-21

## 2022-01-11 ASSESSMENT — MIFFLIN-ST. JEOR: SCORE: 1388.75

## 2022-01-11 NOTE — PROGRESS NOTES
SUBJECTIVE:   CC: Ashanti Yanes is an 56 year old woman who presents for preventive health visit.       She is uTD on her mammogram and colonoscopy, she had 1 polyp on her colonoscopy, it was benign.    She has a history of sinus problems.    Her  reports that she is coughing in her sleep x 1 year.    She doesn't know that she is doing this.    She is trying to exercise when she can.  She has a health club membership, but because her  is low immunity, she has not been going to the club.    She normally walks now, before she would do elliptical, treadmill, bike or rowing machine.  She also does strengthening exercises in the am.  She exercises regularly.    She generally has good health.    Her  had kidney issues and renal stones .   Her mom recently fell.  Her mom lives in a 55+ senior apartment.    She was diagnosed with a uti, and bacteremia, and had pulmonary edema.  She is at a TCU right now.    She is BRCA negative.    She follows an [place] diet.  Her  is [place].  She eats very little in the way of sweets.  She has fish on .    She has gone through menopause    fam hx:  Hx of ovarian cancer on her mother's side.  Her mom had a hysterectomy 30 years ago.  Her mom had no cancer at that time.    Brother  at age 44 of MI.  Dad had heart disease and diabetes.    H/o HPV.      Patient has been advised of split billing requirements and indicates understanding: Yes  Healthy Habits:     Getting at least 3 servings of Calcium per day:  Yes    Bi-annual eye exam:  NO    Dental care twice a year:  NO    Sleep apnea or symptoms of sleep apnea:  None    Diet:  Regular (no restrictions)    Frequency of exercise:  2-3 days/week    Duration of exercise:  30-45 minutes    Taking medications regularly:  Yes    Medication side effects:  Not applicable    PHQ-2 Total Score: 1    Additional concerns today:  Yes              Today's PHQ-2 Score:   PHQ-2 (  [employer])  1/10/2022   Q1: Little interest or pleasure in doing things 0   Q2: Feeling down, depressed or hopeless 1   PHQ-2 Score 1   PHQ-2 Total Score (12-17 Years)- Positive if 3 or more points; Administer PHQ-A if positive -   Q1: Little interest or pleasure in doing things Not at all   Q2: Feeling down, depressed or hopeless Several days   PHQ-2 Score 1       Abuse: Current or Past (Physical, Sexual or Emotional) - No  Do you feel safe in your environment? Yes    Have you ever done Advance Care Planning? (For example, a Health Directive, POLST, or a discussion with a medical provider or your loved ones about your wishes): No, advance care planning information given to patient to review.  Patient declined advance care planning discussion at this time.    Social History     Tobacco Use     Smoking status: Never Smoker     Smokeless tobacco: Never Used   Substance Use Topics     Alcohol use: Yes     Alcohol/week: 0.8 - 2.5 standard drinks     Types: 1 - 3 Standard drinks or equivalent per week     Comment: occ     If you drink alcohol do you typically have >3 drinks per day or >7 drinks per week? No    Alcohol Use 1/10/2022   Prescreen: >3 drinks/day or >7 drinks/week? No   No flowsheet data found.    Reviewed orders with patient.  Reviewed health maintenance and updated orders accordingly - Yes  Lab work is in ordered     Breast Cancer Screening:  Any new diagnosis of family breast, ovarian, or bowel cancer? No    FHS-7:   Breast CA Risk Assessment (FHS-7) 11/11/2021   Did any of your first-degree relatives have breast or ovarian cancer? No   Did any of your relatives have bilateral breast cancer? No   Did any man in your family have breast cancer? No   Did any woman in your family have breast and ovarian cancer? No   Did any woman in your family have breast cancer before age 50 y? No   Do you have 2 or more relatives with breast and/or ovarian cancer? No   Do you have 2 or more relatives with breast and/or bowel cancer? No  "        Pertinent mammograms are reviewed under the imaging tab.    History of abnormal Pap smear: NO - age 30-65 PAP every 5 years with negative HPV co-testing recommended  PAP / HPV Latest Ref Rng & Units 2019 3/17/2016 3/27/2015   PAP (Historical) - NIL OTHER-NIL, See Result NIL   HPV16 NEG:Negative Negative Negative Negative   HPV18 NEG:Negative Negative Negative Negative   HRHPV NEG:Negative Negative Negative Positive(A)     Reviewed and updated as needed this visit by clinical staff  Tobacco  Allergies  Meds             Reviewed and updated as needed this visit by Provider               Past Medical History:   Diagnosis Date     CARDIOVASCULAR SCREENING; LDL GOAL LESS THAN 130      Cervical high risk HPV (human papillomavirus) test positive 3/2015    NIL pap, + HPV (not 16 or 18)     Dislocation of patella, left, closed      NO ACTIVE PROBLEMS       Past Surgical History:   Procedure Laterality Date     COLONOSCOPY N/A 2020    Procedure: COLONOSCOPY, WITH POLYPECTOMY;  Surgeon: Stephy Lopez MD;  Location:  OR     [facility] APPENDECTOMY      acute     OB History    Para Term  AB Living   0 0 0 0 0 0   SAB IAB Ectopic Multiple Live Births   0 0 0 0 0       Review of Systems  Had some dark brown discharge last weekend.  It reminded her of onset of menses.  Had smell to it.  It has stopped.       OBJECTIVE:   /68   Pulse 67   Temp 98.5  F (36.9  C) (Oral)   Resp 18   Ht 1.725 m (5' 7.91\")   Wt 74.2 kg (163 lb 8 oz)   LMP 2017 (Exact Date)   SpO2 98%   BMI 24.92 kg/m    Physical Exam  General Appearance: Alert, cooperative, no distress, appears stated age  Head: Normocephalic, without obvious abnormality, atraumatic  Eyes: PERRL, conjunctiva/corneas clear, EOM's intact  Ears: Normal TM's and external ear canals, both ears  Nose: Nares normal, septum midline,mucosa normal, no drainage  Throat: Lips, mucosa, and tongue normal; teeth and gums normal  Neck: Supple, " symmetrical, trachea midline, no adenopathy;  thyroid: not enlarged, symmetric, no tenderness/mass/nodules; no carotid bruit or JVD  Back: Symmetric, no curvature, ROM normal, no CVA tenderness  Lungs: Clear to auscultation bilaterally, respirations unlabored  Breasts: No breast masses, tenderness, asymmetry, or nipple discharge.  Heart: Regular rate and rhythm, S1 and S2 normal, no murmur, rub, or gallop,   Abdomen: Soft, non-tender, bowel sounds active all four quadrants,  no masses, no organomegaly.  Extremities: Extremities normal, atraumatic, no cyanosis or edema  Skin: Skin color, texture, turgor normal, no rashes or lesions.   noted.   Varicose veins noted in legs.    Lymph nodes: Cervical, supraclavicular, and axillary nodes normal  Neurologic: Normal   Genitourinary Exam:   Vulva: normal skin.  No lesions noted.  Nontender.    Urethral meatus: normal size and location, no lesions or discharge   Urethra: no tenderness or masses   Bladder: no fullness or tenderness   Vagina: normal appearance, physiologic discharge. No evidence of cystocele or rectocele.   Mild loss of rugae.  No bleeding noted.    Cervix: normal appearance, no lesions, no cervical motion tenderness   Rectal exam: deferred             ASSESSMENT/PLAN:   1. Encounter for hepatitis C screening test for low risk patient    - Hepatitis C antibody; Future    2. Hypercholesterolemia  Reviewed last few cholesterol tests.  Strong family h/o cad.    Consider statin if LDL is increasing.      - Lipid panel; Future    3. Need for vaccination  She will check on a zoster vaccine and where it is covered.    - HEPATITIS A VACCINE (ADULT)    4. Postmenopausal bleeding  Reviewed that it could be BV, but that an ultrasound is warranted.    No recent intercourse.    - US Pelvic Complete with Transvaginal; Future  - Wet prep - Clinic Collect  - UA with Microscopic reflex to Culture - Clinic Collect    5. Cough  At night.  No dysphagia, no voice changes, she does  "have a h/o of sinus problems and postnasal drip.  Will give fluticasone for now.  If sx persist, consider silent gerd and start prilosec.  If any worsening sx, will need further work up.    - fluticasone (FLONASE) 50 MCG/ACT nasal spray; Spray 1 spray into both nostrils daily  Dispense: 16 mL; Refill: 3    6.  Healthy adult  Reviewed healthy diet  Reviewed colonoscopy  Reviewed need for pap in .    Reviewed HIV screening, which she declines.        Patient has been advised of split billing requirements and indicates understanding: Yes  COUNSELING:  Reviewed preventive health counseling, as reflected in patient instructions       Regular exercise       Healthy diet/nutrition       Consider Hep C screening for all patients one time for ages 18-79 years       HIV screeninx in teen years, 1x in adult years, and at intervals if high risk    Estimated body mass index is 24.92 kg/m  as calculated from the following:    Height as of this encounter: 1.725 m (5' 7.91\").    Weight as of this encounter: 74.2 kg (163 lb 8 oz).        She reports that she has never smoked. She has never used smokeless tobacco.      Counseling Resources:  ATP IV Guidelines  Pooled Cohorts Equation Calculator  Breast Cancer Risk Calculator  BRCA-Related Cancer Risk Assessment: FHS-7 Tool  FRAX Risk Assessment  ICSI Preventive Guidelines  Dietary Guidelines for Americans, 2010  USDA's MyPlate  ASA Prophylaxis  Lung CA Screening    Lesley Leo MD  [facility]  "

## 2022-01-21 ENCOUNTER — LAB (OUTPATIENT)
Dept: LAB | Facility: CLINIC | Age: 57
End: 2022-01-21
Payer: COMMERCIAL

## 2022-01-21 DIAGNOSIS — Z11.59 ENCOUNTER FOR HEPATITIS C SCREENING TEST FOR LOW RISK PATIENT: ICD-10-CM

## 2022-01-21 DIAGNOSIS — E78.00 HYPERCHOLESTEROLEMIA: ICD-10-CM

## 2022-01-21 LAB
ALBUMIN UR-MCNC: NEGATIVE MG/DL
APPEARANCE UR: CLEAR
BACTERIA #/AREA URNS HPF: ABNORMAL /HPF
BILIRUB UR QL STRIP: NEGATIVE
CHOLEST SERPL-MCNC: 220 MG/DL
COLOR UR AUTO: YELLOW
FASTING STATUS PATIENT QL REPORTED: YES
GLUCOSE UR STRIP-MCNC: NEGATIVE MG/DL
HDLC SERPL-MCNC: 65 MG/DL
HGB UR QL STRIP: ABNORMAL
KETONES UR STRIP-MCNC: NEGATIVE MG/DL
LDLC SERPL CALC-MCNC: 141 MG/DL
LEUKOCYTE ESTERASE UR QL STRIP: NEGATIVE
NITRATE UR QL: NEGATIVE
PH UR STRIP: 5.5 [PH] (ref 5–7)
RBC #/AREA URNS AUTO: ABNORMAL /HPF
SP GR UR STRIP: 1.01 (ref 1–1.03)
SQUAMOUS #/AREA URNS AUTO: ABNORMAL /LPF
TRIGL SERPL-MCNC: 70 MG/DL
UROBILINOGEN UR STRIP-ACNC: 0.2 E.U./DL
WBC #/AREA URNS AUTO: ABNORMAL /HPF

## 2022-01-21 PROCEDURE — 86803 HEPATITIS C AB TEST: CPT

## 2022-01-21 PROCEDURE — 36415 COLL VENOUS BLD VENIPUNCTURE: CPT

## 2022-01-21 PROCEDURE — 81001 URINALYSIS AUTO W/SCOPE: CPT | Performed by: FAMILY MEDICINE

## 2022-01-21 PROCEDURE — 80061 LIPID PANEL: CPT

## 2022-01-24 LAB — HCV AB SERPL QL IA: NEGATIVE

## 2022-01-25 ENCOUNTER — HOSPITAL ENCOUNTER (OUTPATIENT)
Dept: ULTRASOUND IMAGING | Facility: HOSPITAL | Age: 57
Discharge: HOME | End: 2022-01-25
Attending: FAMILY MEDICINE | Admitting: FAMILY MEDICINE
Payer: COMMERCIAL

## 2022-01-25 DIAGNOSIS — N95.0 POSTMENOPAUSAL BLEEDING: ICD-10-CM

## 2022-01-25 PROCEDURE — 76830 TRANSVAGINAL US NON-OB: CPT

## 2022-01-28 DIAGNOSIS — N95.0 POST-MENOPAUSAL BLEEDING: Primary | ICD-10-CM

## 2022-01-28 DIAGNOSIS — R93.89 ABNORMAL PELVIC ULTRASOUND: ICD-10-CM

## 2022-01-31 ENCOUNTER — MEDICAL CORRESPONDENCE (OUTPATIENT)
Dept: HEALTH INFORMATION MANAGEMENT | Facility: CLINIC | Age: 57
End: 2022-01-31
Payer: COMMERCIAL

## 2022-02-08 ENCOUNTER — TRANSFERRED RECORDS (OUTPATIENT)
Dept: HEALTH INFORMATION MANAGEMENT | Facility: CLINIC | Age: 57
End: 2022-02-08
Payer: COMMERCIAL

## 2022-02-15 DIAGNOSIS — Z11.59 ENCOUNTER FOR SCREENING FOR OTHER VIRAL DISEASES: Primary | ICD-10-CM

## 2022-02-25 ENCOUNTER — OFFICE VISIT (OUTPATIENT)
Dept: FAMILY MEDICINE | Facility: CLINIC | Age: 57
End: 2022-02-25
Payer: COMMERCIAL

## 2022-02-25 VITALS
WEIGHT: 162 LBS | HEIGHT: 68 IN | OXYGEN SATURATION: 98 % | DIASTOLIC BLOOD PRESSURE: 60 MMHG | SYSTOLIC BLOOD PRESSURE: 110 MMHG | TEMPERATURE: 98.2 F | RESPIRATION RATE: 20 BRPM | BODY MASS INDEX: 24.55 KG/M2 | HEART RATE: 62 BPM

## 2022-02-25 DIAGNOSIS — Z01.818 PREOP GENERAL PHYSICAL EXAM: Primary | ICD-10-CM

## 2022-02-25 PROCEDURE — 99213 OFFICE O/P EST LOW 20 MIN: CPT | Performed by: FAMILY MEDICINE

## 2022-02-25 NOTE — PATIENT INSTRUCTIONS
Preparing for Your Surgery  Getting started  A nurse will call you to review your health history and instructions. They will give you an arrival time based on your scheduled surgery time. Please be ready to share:    Your doctor's clinic name and phone number    Your medical, surgical and anesthesia history    A list of allergies and sensitivities    A list of medicines, including herbal treatments and over-the-counter drugs    Whether the patient has a legal guardian (ask how to send us the papers in advance)  Please tell us if you're pregnant--or if there's any chance you might be pregnant. Some surgeries may injure a fetus (unborn baby), so they require a pregnancy test. Surgeries that are safe for a fetus don't always need a test, and you can choose whether to have one.   If you have a child who's having surgery, please ask for a copy of Preparing for Your Child's Surgery.    Preparing for surgery    Within 30 days of surgery: Have a pre-op exam (sometimes called an H&P, or History and Physical). This can be done at a clinic or pre-operative center.  ? If you're having a , you may not need this exam. Talk to your care team.    At your pre-op exam, talk to your care team about all medicines you take. If you need to stop any medicines before surgery, ask when to start taking them again.  ? We do this for your safety. Many medicines can make you bleed too much during surgery. Some change how well surgery (anesthesia) drugs work.    Call your insurance company to let them know you're having surgery. (If you don't have insurance, call [phone].)    Call your clinic if there's any change in your health. This includes signs of a cold or flu (sore throat, runny nose, cough, rash, fever). It also includes a scrape or scratch near the surgery site.    If you have questions on the day of surgery, call your hospital or surgery center.  COVID testing  You may need to be tested for COVID-19 before having  surgery. If so, your surgical team will give you instructions for scheduling this test, separate from your preoperative history and physical.  Eating and drinking guidelines  For your safety: Unless your surgeon tells you otherwise, follow the guidelines below.    Eat and drink as usual until 8 hours before surgery. After that, no food or milk.    Drink clear liquids until 2 hours before surgery. These are liquids you can see through, like water, Gatorade and Propel Water. You may also have black coffee and tea (no cream or milk).    Nothing by mouth within 2 hours of surgery. This includes gum, candy and breath mints.    If you drink alcohol: Stop drinking it the night before surgery.    If your care team tells you to take medicine on the morning of surgery, it's okay to take it with a sip of water.  Preventing infection    Shower or bathe the night before and morning of your surgery. Follow the instructions your clinic gave you. (If no instructions, use regular soap.)    Don't shave or clip hair near your surgery site. We'll remove the hair if needed.    Don't smoke or vape the morning of surgery. You may chew nicotine gum up to 2 hours before surgery. A nicotine patch is okay.  ? Note: Some surgeries require you to completely quit smoking and nicotine. Check with your surgeon.    Your care team will make every effort to keep you safe from infection. We will:  ? Clean our hands often with soap and water (or an alcohol-based hand rub).  ? Clean the skin at your surgery site with a special soap that kills germs.  ? Give you a special gown to keep you warm. (Cold raises the risk of infection.)  ? Wear special hair covers, masks, gowns and gloves during surgery.  ? Give antibiotic medicine, if prescribed. Not all surgeries need antibiotics.  What to bring on the day of surgery    Photo ID and insurance card    Copy of your health care directive, if you have one    Glasses and hearing aides (bring cases)  ? You can't  wear contacts during surgery    Inhaler and eye drops, if you use them (tell us about these when you arrive)    CPAP machine or breathing device, if you use them    A few personal items, if spending the night    If you have . . .  ? A pacemaker, ICD (cardiac defibrillator) or other implant: Bring the ID card.  ? An implanted stimulator: Bring the remote control.  ? A legal guardian: Bring a copy of the certified (court-stamped) guardianship papers.  Please remove any jewelry, including body piercings. Leave jewelry and other valuables at home.  If you're going home the day of surgery    You must have a responsible adult drive you home. They should stay with you overnight as well.    If you don't have someone to stay with you, and you aren't safe to go home alone, we may keep you overnight. Insurance often won't pay for this.  After surgery  If it's hard to control your pain or you need more pain medicine, please call your surgeon's office.  Questions?   If you have any questions for your care team, list them here: _________________________________________________________________________________________________________________________________________________________________________ ____________________________________ ____________________________________ ____________________________________  For informational purposes only. Not to replace the advice of your health care provider. Copyright   2003, 2019 [facility]. All rights reserved. Clinically reviewed by Rachel Veras MD. [employer] [id number] - REV 07/21.    Before Your Procedure or Hospital Admission  Testing for COVID-19 (Coronavirus)  Thank you for choosing [facility] for your health care needs. This is a very challenging time for everyone. The World Health Organization and the State [facility] have declared the COVID-19 virus a pandemic.   Our goal is to keep you and our team here at [facility] safe and healthy. We've taken several  "steps to make this happen. For example:    We screen our staff, care teams and patients for COVID-19.    Everyone at [facility] must wear a mask and stay 6 feet apart.    We are limiting hospital and clinic visitors.  Before you come in  All patients must get tested for COVID-19. Your test needs to happen 2 to 4 days before you check in to the hospital or surgery site.   A clinic scheduler will call about a week in advance to set up a testing time at one of our labs where we'll take a swab of your nose or throat.  Note: If you go to a clinic or pharmacy like [facility] or [facility] for your test, make sure it's a \"RT-PCR\" test, not a \"rapid\" COVID-19 test. (See Questions and Answers below.)  After the test, please stay at home and stay out of contact with other people. It will be harder for you to recover if you get COVID-19 before your treatment.  Please follow all current safety guidelines, including:    Limit trips outside your home.    Limit the number of people you see.    Always wear a mask outside your home.    Use social distancing. (Stay 6 feet away from others whenever you can.)    Wash your hands often.  If your test shows you have COVID-19  If your test is positive, we'll let you know. A positive test means that you have the virus.   We'll probably have to postpone your admission, surgery or procedure. Your doctor will discuss this with you. After that, we'll let you know what to do and when you can reschedule.   We may need to cancel your treatment on short notice for other reasons, too.  If your test shows you DON'T have COVID-19  Even if your test is negative, you may still get COVID-19. It's rare but, sometimes, the test result is wrong. You could also catch the virus after taking the test.   There's a very small chance that you could catch COVID-19 in the hospital or surgery center. [facility] has taken many steps to prevent this from happening.   Day of your surgery or procedure    Please " "come wearing a mask or something else that covers both your nose and mouth.    When you arrive, we'll ask you some questions to find out if you have any signs or symptoms of COVID-19.    Ask your care team if you can have visitors. All visitors must wear masks and will be screened for signs of COVID-19.  ? Even if no visitors are allowed, you can still have with you:    Your legal guardian or legal decision maker    A parent and one other visitor, if you are younger than 18 years old    A partner and a , if you are in labor  ? We might need to teach you about taking care of yourself after surgery. If so, a visitor can come into the hospital to learn about it, too.  ? The rules for visitors change often, depending on how much the virus is spreading. To learn more, see Visiting a Loved One in the Hospital during the COVID-19 Outbreak.  Please call your care team, hospital or surgery center if you have any questions. We thank you for your understanding and for choosing [facility] for your care.   Questions and Answers  Does it matter where I get tested for COVID-19?  Yes. We urge you to get tested at one of our [facility] COVID-19 testing sites. We process these tests in our lab and can get the results quickly. Your [facility] care team needs to get your results before you check in.  What should I do if I can't get tested at [facility]?  You can get tested somewhere else, but you'll need to take these extra steps:  1. Contact your family doctor or clinic to arrange your test.  2. Take the test within 4 days of your surgery or procedure. We can't accept tests older than 4 days.  3. Make sure your doctor or clinic faxes your results to [facility] at [phone].  If we don't get your results in time, we may have to postpone or cancel your treatment.  Ask if you're getting a \"RT-PCR\" COVID-19 test. It should NOT be a \"rapid\" COVID-19 test. Many drug stores use \"rapid\" tests, but " "they may not be as accurate. We don't accept the results of \"rapid\" tests.  For informational purposes only. Not to replace the advice of your health care provider. Copyright   2020 [facility]. All rights reserved. Clinically reviewed by [facility] and the [facility] COVID-19 Clinical Team. [employer] [id number] - Rev 10/07/20.        "

## 2022-02-25 NOTE — H&P (VIEW-ONLY)
[address]  [place] [address]  Phone: [phone]  Fax: [phone]  Primary Provider: Lesley Leo  Pre-op Performing Provider: FADUMO PATTERSON      PREOPERATIVE EVALUATION:  Today's date: 2/25/2022    Ashanti Yanes is a 56 year old female who presents for a preoperative evaluation.    Surgical Information:  Surgery/Procedure: Hysteroscopy   Surgery Location: [facility]   Surgeon: Idalmis Todd   Surgery Date: 3/10/22  Time of Surgery: 8:00 am   Where patient plans to recover: At home with family  Fax number for surgical facility: Note does not need to be faxed, will be available electronically in Epic.    Type of Anesthesia Anticipated: to be determined    Assessment & Plan     The proposed surgical procedure is considered LOW risk.    Preop general physical exam  Postmenopausal bleeding  Risks and Recommendations:  The patient has the following additional risks and recommendations for perioperative complications:   - No identified additional risk factors other than previously addressed        RECOMMENDATION:  Patient is optimized and is an acceptable candidate for the proposed procedure.  No further diagnostic evaluationis recommended at this time.       Review of external notes as documented above                   Subjective     HPI related to upcoming procedure:   Had postmenopausal bleeding early January.  Ultrasound showed possible polyp, so visualization recommended.  Has not recurred.    Family history breast and ovarian cancer    Preop Questions 2/23/2022   1. Have you ever had a heart attack or stroke? No   2. Have you ever had surgery on your heart or blood vessels, such as a stent placement, a coronary artery bypass, or surgery on an artery in your head, neck, heart, or legs? No   3. Do you have chest pain with activity? No   4. Do you have a history of  heart failure? No   5. Do you currently have a cold,  bronchitis or symptoms of other infection? No   6. Do you have a cough, shortness of breath, or wheezing? No   7. Do you or anyone in your family have previous history of blood clots? No   8. Do you or does anyone in your family have a serious bleeding problem such as prolonged bleeding following surgeries or cuts? No   9. Have you ever had problems with anemia or been told to take iron pills? No   10. Have you had any abnormal blood loss such as black, tarry or bloody stools, or abnormal vaginal bleeding? YES -   11. Have you ever had a blood transfusion? No   12. Are you willing to have a blood transfusion if it is medically needed before, during, or after your surgery? Yes   13. Have you or any of your relatives ever had problems with anesthesia? No   14. Do you have sleep apnea, excessive snoring or daytime drowsiness? No   15. Do you have any artifical heart valves or other implanted medical devices like a pacemaker, defibrillator, or continuous glucose monitor? No   16. Do you have artificial joints? No   17. Are you allergic to latex? No   18. Is there any chance that you may be pregnant? No       Health Care Directive:  Patient does not have a Health Care Directive or Living Will: Discussed advance care planning with patient; information given to patient to review.    Preoperative Review of :   reviewed - no record of controlled substances prescribed.          Review of Systems  Remainder of complete review systems is negative.     Patient Active Problem List    Diagnosis Date Noted     Family history of malignant neoplasm of ovary 07/16/2020     Priority: Medium     Dense breasts 04/15/2013     Priority: Medium     4/11/13 - Normal mammo; recommended yearly mammos for very dense breast tissue.  6/17/15 - Normal mammo; very dense tissue. Yearly screening recommended.        CARDIOVASCULAR SCREENING; LDL GOAL LESS THAN 130      Priority: Medium     Dislocation of patella, left, closed      Priority:  Medium     Family history of cardiovascular disease 2006     Priority: Medium     Brother  age 44  Father  age 60  PGF  age 48        Past Medical History:   Diagnosis Date     CARDIOVASCULAR SCREENING; LDL GOAL LESS THAN 130      Cervical high risk HPV (human papillomavirus) test positive 2015    NIL pap, + HPV (not 16 or 18)     Dislocation of patella, left, closed      Past Surgical History:   Procedure Laterality Date     COLONOSCOPY N/A 2020    Procedure: COLONOSCOPY, WITH POLYPECTOMY;  Surgeon: Stephy Lopez MD;  Location:  OR     [facility] APPENDECTOMY      acute     Current Outpatient Medications   Medication Sig Dispense Refill     fluticasone (FLONASE) 50 MCG/ACT nasal spray Spray 1 spray into both nostrils daily 16 mL 3     Glucosamine-Chondroitin (GLUCOSAMINE CHONDR COMPLEX PO)        Multiple Vitamins-Calcium (ONE-A-DAY WOMENS PO)          No Known Allergies     Social History     Tobacco Use     Smoking status: Never Smoker     Smokeless tobacco: Never Used   Substance Use Topics     Alcohol use: Yes     Alcohol/week: 0.8 - 2.5 standard drinks     Comment: occ     Family History   Problem Relation Age of Onset     C.A.D. Father         MI  age 59     Diabetes Father         Type 2     Heart Disease Father      Cerebrovascular Disease Maternal Grandfather      Cancer Maternal Grandmother         ovarian     Other Cancer Maternal Grandmother         Ovarian     C.A.D. Paternal Grandfather         MI     Lipids Mother         resolved     Depression Mother      Other - See Comments Paternal Grandmother         reumatic fever     Cerebrovascular Disease Brother      Heart Disease Brother      Myocardial Infarction Brother      Coronary Artery Disease Brother 44     Alcohol/Drug Brother         ETOH     Cardiovascular Brother      Heart Disease Brother         right exterior septal myocardial fibrosis/arteriosclorsis     Gynecology Maternal Aunt         ovarian cysts      "Gynecology Maternal Aunt         uterine tumor     History   Drug Use No         Objective     /60   Pulse 62   Temp 98.2  F (36.8  C) (Oral)   Resp 20   Ht 1.725 m (5' 7.91\")   Wt 73.5 kg (162 lb)   LMP 01/05/2017 (Exact Date)   SpO2 98%   BMI 24.70 kg/m      Physical Exam  GENERAL APPEARANCE: healthy, alert and no distress  HENT: ear canals and TM's normal and nose and mouth without ulcers or lesions  RESP: lungs clear to auscultation - no rales, rhonchi or wheezes  CV: regular rate and rhythm, normal S1 S2, no S3 or S4 and no murmur, click or rub   ABDOMEN: soft, nontender, no HSM or masses and bowel sounds normal  NEURO: Normal strength and tone, sensory exam grossly normal, mentation intact and speech normal    No results for input(s): HGB, PLT, INR, NA, POTASSIUM, CR, A1C in the last 49602 hours.     Diagnostics:  No labs were ordered during this visit. COVID test to be arranged per surgeon  No EKG required for low risk surgery (cataract, skin procedure, breast biopsy, etc).    Revised Cardiac Risk Index (RCRI):  The patient has the following serious cardiovascular risks for perioperative complications:   - No serious cardiac risks = 0 points     RCRI Interpretation: 0 points: Class I (very low risk - 0.4% complication rate)           Signed Electronically by: Alma Rizo MD  Copy of this evaluation report is provided to requesting physician.      "

## 2022-02-25 NOTE — PROGRESS NOTES
[address]  [place] [address]  Phone: [phone]  Fax: [phone]  Primary Provider: Lesley Leo  Pre-op Performing Provider: FADUMO PATTERSON      PREOPERATIVE EVALUATION:  Today's date: 2/25/2022    Ashanti Yanes is a 56 year old female who presents for a preoperative evaluation.    Surgical Information:  Surgery/Procedure: Hysteroscopy   Surgery Location: [facility]   Surgeon: Idalmis Todd   Surgery Date: 3/10/22  Time of Surgery: 8:00 am   Where patient plans to recover: At home with family  Fax number for surgical facility: Note does not need to be faxed, will be available electronically in Epic.    Type of Anesthesia Anticipated: to be determined    Assessment & Plan     The proposed surgical procedure is considered LOW risk.    Preop general physical exam  Postmenopausal bleeding  Risks and Recommendations:  The patient has the following additional risks and recommendations for perioperative complications:   - No identified additional risk factors other than previously addressed        RECOMMENDATION:  Patient is optimized and is an acceptable candidate for the proposed procedure.  No further diagnostic evaluationis recommended at this time.       Review of external notes as documented above                   Subjective     HPI related to upcoming procedure:   Had postmenopausal bleeding early January.  Ultrasound showed possible polyp, so visualization recommended.  Has not recurred.    Family history breast and ovarian cancer    Preop Questions 2/23/2022   1. Have you ever had a heart attack or stroke? No   2. Have you ever had surgery on your heart or blood vessels, such as a stent placement, a coronary artery bypass, or surgery on an artery in your head, neck, heart, or legs? No   3. Do you have chest pain with activity? No   4. Do you have a history of  heart failure? No   5. Do you currently have a cold,  bronchitis or symptoms of other infection? No   6. Do you have a cough, shortness of breath, or wheezing? No   7. Do you or anyone in your family have previous history of blood clots? No   8. Do you or does anyone in your family have a serious bleeding problem such as prolonged bleeding following surgeries or cuts? No   9. Have you ever had problems with anemia or been told to take iron pills? No   10. Have you had any abnormal blood loss such as black, tarry or bloody stools, or abnormal vaginal bleeding? YES -   11. Have you ever had a blood transfusion? No   12. Are you willing to have a blood transfusion if it is medically needed before, during, or after your surgery? Yes   13. Have you or any of your relatives ever had problems with anesthesia? No   14. Do you have sleep apnea, excessive snoring or daytime drowsiness? No   15. Do you have any artifical heart valves or other implanted medical devices like a pacemaker, defibrillator, or continuous glucose monitor? No   16. Do you have artificial joints? No   17. Are you allergic to latex? No   18. Is there any chance that you may be pregnant? No       Health Care Directive:  Patient does not have a Health Care Directive or Living Will: Discussed advance care planning with patient; information given to patient to review.    Preoperative Review of :   reviewed - no record of controlled substances prescribed.          Review of Systems  Remainder of complete review systems is negative.     Patient Active Problem List    Diagnosis Date Noted     Family history of malignant neoplasm of ovary 07/16/2020     Priority: Medium     Dense breasts 04/15/2013     Priority: Medium     4/11/13 - Normal mammo; recommended yearly mammos for very dense breast tissue.  6/17/15 - Normal mammo; very dense tissue. Yearly screening recommended.        CARDIOVASCULAR SCREENING; LDL GOAL LESS THAN 130      Priority: Medium     Dislocation of patella, left, closed      Priority:  Medium     Family history of cardiovascular disease 2006     Priority: Medium     Brother  age 44  Father  age 60  PGF  age 48        Past Medical History:   Diagnosis Date     CARDIOVASCULAR SCREENING; LDL GOAL LESS THAN 130      Cervical high risk HPV (human papillomavirus) test positive 2015    NIL pap, + HPV (not 16 or 18)     Dislocation of patella, left, closed      Past Surgical History:   Procedure Laterality Date     COLONOSCOPY N/A 2020    Procedure: COLONOSCOPY, WITH POLYPECTOMY;  Surgeon: Stephy Lopez MD;  Location:  OR     [facility] APPENDECTOMY      acute     Current Outpatient Medications   Medication Sig Dispense Refill     fluticasone (FLONASE) 50 MCG/ACT nasal spray Spray 1 spray into both nostrils daily 16 mL 3     Glucosamine-Chondroitin (GLUCOSAMINE CHONDR COMPLEX PO)        Multiple Vitamins-Calcium (ONE-A-DAY WOMENS PO)          No Known Allergies     Social History     Tobacco Use     Smoking status: Never Smoker     Smokeless tobacco: Never Used   Substance Use Topics     Alcohol use: Yes     Alcohol/week: 0.8 - 2.5 standard drinks     Comment: occ     Family History   Problem Relation Age of Onset     C.A.D. Father         MI  age 59     Diabetes Father         Type 2     Heart Disease Father      Cerebrovascular Disease Maternal Grandfather      Cancer Maternal Grandmother         ovarian     Other Cancer Maternal Grandmother         Ovarian     C.A.D. Paternal Grandfather         MI     Lipids Mother         resolved     Depression Mother      Other - See Comments Paternal Grandmother         reumatic fever     Cerebrovascular Disease Brother      Heart Disease Brother      Myocardial Infarction Brother      Coronary Artery Disease Brother 44     Alcohol/Drug Brother         ETOH     Cardiovascular Brother      Heart Disease Brother         right exterior septal myocardial fibrosis/arteriosclorsis     Gynecology Maternal Aunt         ovarian cysts      "Gynecology Maternal Aunt         uterine tumor     History   Drug Use No         Objective     /60   Pulse 62   Temp 98.2  F (36.8  C) (Oral)   Resp 20   Ht 1.725 m (5' 7.91\")   Wt 73.5 kg (162 lb)   LMP 01/05/2017 (Exact Date)   SpO2 98%   BMI 24.70 kg/m      Physical Exam  GENERAL APPEARANCE: healthy, alert and no distress  HENT: ear canals and TM's normal and nose and mouth without ulcers or lesions  RESP: lungs clear to auscultation - no rales, rhonchi or wheezes  CV: regular rate and rhythm, normal S1 S2, no S3 or S4 and no murmur, click or rub   ABDOMEN: soft, nontender, no HSM or masses and bowel sounds normal  NEURO: Normal strength and tone, sensory exam grossly normal, mentation intact and speech normal    No results for input(s): HGB, PLT, INR, NA, POTASSIUM, CR, A1C in the last [id number] hours.     Diagnostics:  No labs were ordered during this visit. COVID test to be arranged per surgeon  No EKG required for low risk surgery (cataract, skin procedure, breast biopsy, etc).    Revised Cardiac Risk Index (RCRI):  The patient has the following serious cardiovascular risks for perioperative complications:   - No serious cardiac risks = 0 points     RCRI Interpretation: 0 points: Class I (very low risk - 0.4% complication rate)           Signed Electronically by: Alma Rizo MD  Copy of this evaluation report is provided to requesting physician.      "

## 2022-02-28 PROBLEM — Z82.49 FAMILY HISTORY OF ISCHEMIC HEART DISEASE: Status: RESOLVED | Noted: 2020-07-16 | Resolved: 2022-02-28

## 2022-03-07 ENCOUNTER — LAB (OUTPATIENT)
Dept: LAB | Facility: CLINIC | Age: 57
End: 2022-03-07
Attending: STUDENT IN AN ORGANIZED HEALTH CARE EDUCATION/TRAINING PROGRAM
Payer: COMMERCIAL

## 2022-03-07 DIAGNOSIS — Z11.59 ENCOUNTER FOR SCREENING FOR OTHER VIRAL DISEASES: ICD-10-CM

## 2022-03-07 PROCEDURE — U0005 INFEC AGEN DETEC AMPLI PROBE: HCPCS

## 2022-03-07 PROCEDURE — U0003 INFECTIOUS AGENT DETECTION BY NUCLEIC ACID (DNA OR RNA); SEVERE ACUTE RESPIRATORY SYNDROME CORONAVIRUS 2 (SARS-COV-2) (CORONAVIRUS DISEASE [COVID-19]), AMPLIFIED PROBE TECHNIQUE, MAKING USE OF HIGH THROUGHPUT TECHNOLOGIES AS DESCRIBED BY CMS-2020-01-R: HCPCS

## 2022-03-08 LAB — SARS-COV-2 RNA RESP QL NAA+PROBE: NEGATIVE

## 2022-03-09 ENCOUNTER — ANESTHESIA EVENT (OUTPATIENT)
Dept: SURGERY | Facility: AMBULATORY SURGERY CENTER | Age: 57
End: 2022-03-09
Payer: COMMERCIAL

## 2022-03-10 ENCOUNTER — ANESTHESIA (OUTPATIENT)
Dept: SURGERY | Facility: AMBULATORY SURGERY CENTER | Age: 57
End: 2022-03-10
Payer: COMMERCIAL

## 2022-03-10 ENCOUNTER — HOSPITAL ENCOUNTER (OUTPATIENT)
Facility: AMBULATORY SURGERY CENTER | Age: 57
Discharge: HOME | End: 2022-03-10
Attending: STUDENT IN AN ORGANIZED HEALTH CARE EDUCATION/TRAINING PROGRAM
Payer: COMMERCIAL

## 2022-03-10 VITALS
WEIGHT: 162 LBS | SYSTOLIC BLOOD PRESSURE: 103 MMHG | RESPIRATION RATE: 16 BRPM | DIASTOLIC BLOOD PRESSURE: 63 MMHG | HEART RATE: 51 BPM | BODY MASS INDEX: 24.55 KG/M2 | TEMPERATURE: 97.6 F | OXYGEN SATURATION: 99 % | HEIGHT: 68 IN

## 2022-03-10 DIAGNOSIS — Z98.890 STATUS POST HYSTEROSCOPIC POLYPECTOMY: Primary | ICD-10-CM

## 2022-03-10 DIAGNOSIS — N95.0 PMB (POSTMENOPAUSAL BLEEDING): ICD-10-CM

## 2022-03-10 RX ORDER — ONDANSETRON 2 MG/ML
4 INJECTION INTRAMUSCULAR; INTRAVENOUS EVERY 30 MIN PRN
Status: DISCONTINUED | OUTPATIENT
Start: 2022-03-10 | End: 2022-03-11 | Stop reason: HOSPADM

## 2022-03-10 RX ORDER — IBUPROFEN 800 MG/1
800 TABLET, FILM COATED ORAL ONCE
Status: DISCONTINUED | OUTPATIENT
Start: 2022-03-10 | End: 2022-03-11 | Stop reason: HOSPADM

## 2022-03-10 RX ORDER — PROPOFOL 10 MG/ML
INJECTION, EMULSION INTRAVENOUS CONTINUOUS PRN
Status: DISCONTINUED | OUTPATIENT
Start: 2022-03-10 | End: 2022-03-10

## 2022-03-10 RX ORDER — OXYCODONE HYDROCHLORIDE 5 MG/1
5 TABLET ORAL EVERY 4 HOURS PRN
Status: DISCONTINUED | OUTPATIENT
Start: 2022-03-10 | End: 2022-03-11 | Stop reason: HOSPADM

## 2022-03-10 RX ORDER — PROPOFOL 10 MG/ML
INJECTION, EMULSION INTRAVENOUS PRN
Status: DISCONTINUED | OUTPATIENT
Start: 2022-03-10 | End: 2022-03-10

## 2022-03-10 RX ORDER — IBUPROFEN 800 MG/1
800 TABLET, FILM COATED ORAL EVERY 6 HOURS PRN
Qty: 30 TABLET | Refills: 0 | COMMUNITY
Start: 2022-03-10 | End: 2023-04-21

## 2022-03-10 RX ORDER — GLYCOPYRROLATE 0.2 MG/ML
INJECTION, SOLUTION INTRAMUSCULAR; INTRAVENOUS PRN
Status: DISCONTINUED | OUTPATIENT
Start: 2022-03-10 | End: 2022-03-10

## 2022-03-10 RX ORDER — LIDOCAINE 40 MG/G
CREAM TOPICAL
Status: DISCONTINUED | OUTPATIENT
Start: 2022-03-10 | End: 2022-03-11 | Stop reason: HOSPADM

## 2022-03-10 RX ORDER — ONDANSETRON 2 MG/ML
INJECTION INTRAMUSCULAR; INTRAVENOUS PRN
Status: DISCONTINUED | OUTPATIENT
Start: 2022-03-10 | End: 2022-03-10

## 2022-03-10 RX ORDER — ACETAMINOPHEN 325 MG/1
975 TABLET ORAL ONCE
Status: DISCONTINUED | OUTPATIENT
Start: 2022-03-10 | End: 2022-03-11 | Stop reason: HOSPADM

## 2022-03-10 RX ORDER — KETOROLAC TROMETHAMINE 30 MG/ML
INJECTION, SOLUTION INTRAMUSCULAR; INTRAVENOUS PRN
Status: DISCONTINUED | OUTPATIENT
Start: 2022-03-10 | End: 2022-03-10

## 2022-03-10 RX ORDER — LIDOCAINE HYDROCHLORIDE 20 MG/ML
INJECTION, SOLUTION INFILTRATION; PERINEURAL PRN
Status: DISCONTINUED | OUTPATIENT
Start: 2022-03-10 | End: 2022-03-10

## 2022-03-10 RX ORDER — MAGNESIUM HYDROXIDE 1200 MG/15ML
LIQUID ORAL PRN
Status: DISCONTINUED | OUTPATIENT
Start: 2022-03-10 | End: 2022-03-10 | Stop reason: HOSPADM

## 2022-03-10 RX ORDER — SODIUM CHLORIDE, SODIUM LACTATE, POTASSIUM CHLORIDE, CALCIUM CHLORIDE 600; 310; 30; 20 MG/100ML; MG/100ML; MG/100ML; MG/100ML
INJECTION, SOLUTION INTRAVENOUS CONTINUOUS
Status: DISCONTINUED | OUTPATIENT
Start: 2022-03-10 | End: 2022-03-11 | Stop reason: HOSPADM

## 2022-03-10 RX ORDER — HYDROMORPHONE HCL IN WATER/PF 6 MG/30 ML
0.2 PATIENT CONTROLLED ANALGESIA SYRINGE INTRAVENOUS EVERY 5 MIN PRN
Status: DISCONTINUED | OUTPATIENT
Start: 2022-03-10 | End: 2022-03-11 | Stop reason: HOSPADM

## 2022-03-10 RX ORDER — ONDANSETRON 2 MG/ML
4 INJECTION INTRAMUSCULAR; INTRAVENOUS EVERY 6 HOURS PRN
Status: DISCONTINUED | OUTPATIENT
Start: 2022-03-10 | End: 2022-03-11 | Stop reason: HOSPADM

## 2022-03-10 RX ORDER — ONDANSETRON 4 MG/1
4 TABLET, ORALLY DISINTEGRATING ORAL EVERY 30 MIN PRN
Status: DISCONTINUED | OUTPATIENT
Start: 2022-03-10 | End: 2022-03-11 | Stop reason: HOSPADM

## 2022-03-10 RX ORDER — ACETAMINOPHEN 325 MG/1
975 TABLET ORAL EVERY 6 HOURS PRN
Qty: 50 TABLET | Refills: 0 | COMMUNITY
Start: 2022-03-10 | End: 2023-04-21

## 2022-03-10 RX ORDER — FENTANYL CITRATE 0.05 MG/ML
25 INJECTION, SOLUTION INTRAMUSCULAR; INTRAVENOUS
Status: DISCONTINUED | OUTPATIENT
Start: 2022-03-10 | End: 2022-03-11 | Stop reason: HOSPADM

## 2022-03-10 RX ORDER — FENTANYL CITRATE 0.05 MG/ML
25 INJECTION, SOLUTION INTRAMUSCULAR; INTRAVENOUS EVERY 5 MIN PRN
Status: DISCONTINUED | OUTPATIENT
Start: 2022-03-10 | End: 2022-03-11 | Stop reason: HOSPADM

## 2022-03-10 RX ORDER — DEXAMETHASONE SODIUM PHOSPHATE 10 MG/ML
INJECTION, SOLUTION INTRAMUSCULAR; INTRAVENOUS PRN
Status: DISCONTINUED | OUTPATIENT
Start: 2022-03-10 | End: 2022-03-10

## 2022-03-10 RX ORDER — OXYCODONE HYDROCHLORIDE 5 MG/1
5 TABLET ORAL
Status: DISCONTINUED | OUTPATIENT
Start: 2022-03-10 | End: 2022-03-11 | Stop reason: HOSPADM

## 2022-03-10 RX ORDER — MEPERIDINE HYDROCHLORIDE 25 MG/ML
12.5 INJECTION INTRAMUSCULAR; INTRAVENOUS; SUBCUTANEOUS
Status: DISCONTINUED | OUTPATIENT
Start: 2022-03-10 | End: 2022-03-11 | Stop reason: HOSPADM

## 2022-03-10 RX ORDER — FENTANYL CITRATE 50 UG/ML
INJECTION, SOLUTION INTRAMUSCULAR; INTRAVENOUS PRN
Status: DISCONTINUED | OUTPATIENT
Start: 2022-03-10 | End: 2022-03-10

## 2022-03-10 RX ADMIN — PROPOFOL 160 MCG/KG/MIN: 10 INJECTION, EMULSION INTRAVENOUS at 08:20

## 2022-03-10 RX ADMIN — DEXAMETHASONE SODIUM PHOSPHATE 10 MG: 10 INJECTION, SOLUTION INTRAMUSCULAR; INTRAVENOUS at 08:27

## 2022-03-10 RX ADMIN — PROPOFOL 200 MG: 10 INJECTION, EMULSION INTRAVENOUS at 08:20

## 2022-03-10 RX ADMIN — SODIUM CHLORIDE, SODIUM LACTATE, POTASSIUM CHLORIDE, CALCIUM CHLORIDE: 600; 310; 30; 20 INJECTION, SOLUTION INTRAVENOUS at 08:33

## 2022-03-10 RX ADMIN — KETOROLAC TROMETHAMINE 15 MG: 30 INJECTION, SOLUTION INTRAMUSCULAR; INTRAVENOUS at 08:49

## 2022-03-10 RX ADMIN — SODIUM CHLORIDE, SODIUM LACTATE, POTASSIUM CHLORIDE, CALCIUM CHLORIDE: 600; 310; 30; 20 INJECTION, SOLUTION INTRAVENOUS at 10:01

## 2022-03-10 RX ADMIN — SODIUM CHLORIDE, SODIUM LACTATE, POTASSIUM CHLORIDE, CALCIUM CHLORIDE: 600; 310; 30; 20 INJECTION, SOLUTION INTRAVENOUS at 07:45

## 2022-03-10 RX ADMIN — LIDOCAINE HYDROCHLORIDE 40 MG: 20 INJECTION, SOLUTION INFILTRATION; PERINEURAL at 08:20

## 2022-03-10 RX ADMIN — ONDANSETRON 4 MG: 2 INJECTION INTRAMUSCULAR; INTRAVENOUS at 07:55

## 2022-03-10 RX ADMIN — FENTANYL CITRATE 100 MCG: 50 INJECTION, SOLUTION INTRAMUSCULAR; INTRAVENOUS at 08:20

## 2022-03-10 RX ADMIN — ONDANSETRON 4 MG: 2 INJECTION INTRAMUSCULAR; INTRAVENOUS at 08:31

## 2022-03-10 RX ADMIN — GLYCOPYRROLATE 0.2 MG: 0.2 INJECTION, SOLUTION INTRAMUSCULAR; INTRAVENOUS at 08:32

## 2022-03-10 NOTE — ANESTHESIA PREPROCEDURE EVALUATION
Anesthesia Pre-Procedure Evaluation    Patient: Ashanti Yanes   MRN: [id number] : 1965        Procedure : Procedure(s):  HYSTEROSCOPY, WITH DILATION AND CURETTAGE, POLYPECTOMY          Past Medical History:   Diagnosis Date     CARDIOVASCULAR SCREENING; LDL GOAL LESS THAN 130      Cervical high risk HPV (human papillomavirus) test positive 2015    NIL pap, + HPV (not 16 or 18)     Dislocation of patella, left, closed       Past Surgical History:   Procedure Laterality Date     COLONOSCOPY N/A 2020    Procedure: COLONOSCOPY, WITH POLYPECTOMY;  Surgeon: Stephy Lopez MD;  Location: [facility] OR     WISDOM TOOTH EXTRACTION       ZZC APPENDECTOMY  1986    acute      No Known Allergies   Social History     Tobacco Use     Smoking status: Never Smoker     Smokeless tobacco: Never Used   Substance Use Topics     Alcohol use: Yes     Alcohol/week: 0.8 - 2.5 standard drinks     Comment: occ      Wt Readings from Last 1 Encounters:   03/10/22 73.5 kg (162 lb)        Anesthesia Evaluation   Pt has had prior anesthetic.         ROS/MED HX  ENT/Pulmonary:  - neg pulmonary ROS     Neurologic:  - neg neurologic ROS     Cardiovascular:  - neg cardiovascular ROS     METS/Exercise Tolerance: >4 METS    Hematologic:  - neg hematologic  ROS     Musculoskeletal:  - neg musculoskeletal ROS     GI/Hepatic:  - neg GI/hepatic ROS     Renal/Genitourinary:  - neg Renal ROS     Endo:  - neg endo ROS     Psychiatric/Substance Use:       Infectious Disease:  - neg infectious disease ROS     Malignancy:  - neg malignancy ROS     Other:            Physical Exam    Airway        Mallampati: II   TM distance: > 3 FB   Neck ROM: full     Respiratory Devices and Support         Dental  no notable dental history         Cardiovascular          Rhythm and rate: regular and normal     Pulmonary           breath sounds clear to auscultation       Other findings: Patient had a vaso-vagal response to the IV start and is  now actively throwing up chunks of food.  Will need RSI    OUTSIDE LABS:  CBC: No results found for: WBC, HGB, HCT, PLT  BMP:   Lab Results   Component Value Date    GLC 82 09/17/2020    GLC 88 03/05/2013     COAGS: No results found for: PTT, INR, FIBR  POC: No results found for: BGM, HCG, HCGS  HEPATIC: No results found for: ALBUMIN, PROTTOTAL, ALT, AST, GGT, ALKPHOS, BILITOTAL, BILIDIRECT, HANNAH  OTHER:   Lab Results   Component Value Date    TSH 1.39 05/14/2019    CRP 0.3 09/17/2020       Anesthesia Plan    ASA Status:  2      Anesthesia Type: General.     - Airway: ETT   Induction: Intravenous, RSI.   Maintenance: Balanced.        Consents    Anesthesia Plan(s) and associated risks, benefits, and realistic alternatives discussed. Questions answered and patient/representative(s) expressed understanding.     - Discussed: Risks, Benefits and Alternatives for BOTH SEDATION and the PROCEDURE were discussed     - Discussed with:  Patient, Spouse         Postoperative Care       PONV prophylaxis: Ondansetron (or other 5HT-3), Background Propofol Infusion, Dexamethasone or Solumedrol     Comments:    Other Comments: Zofran, decadron, propofol  TRUE rsi for induction please  MD Jazmín Amaro MD

## 2022-03-10 NOTE — ANESTHESIA PROCEDURE NOTES
Airway       Patient location during procedure: OR       Procedure Start/Stop Times: 3/10/2022 8:23 AM  Staff -        Anesthesiologist:  Jazmín Golden MD       CRNA: Heidi Vilchis APRN CRNA       Performed By: CRNA  Consent for Airway        Urgency: elective  Indications and Patient Condition       Indications for airway management: vonda-procedural       Induction type:RSI       Mask difficulty assessment: 0 - not attempted    Final Airway Details       Final airway type: endotracheal airway       Successful airway: Oral and ETT - single  Endotracheal Airway Details        ETT size (mm): 7.0       Cuffed: yes       Successful intubation technique: direct laryngoscopy       DL Blade Type: Bentley 2       Grade View of Cords: 1       Adjucts: stylet and tooth guard       Position: Right       Measured from: lips       Secured at (cm): 20       Bite block used: None    Post intubation assessment        Placement verified by: capnometry, equal breath sounds and chest rise        Number of attempts at approach: 1       Number of other approaches attempted: 0       Secured with: silk tape       Ease of procedure: easy       Dentition: Intact and Unchanged

## 2022-03-10 NOTE — INTERVAL H&P NOTE
I have reviewed the surgical (or preoperative) H&P that is linked to this encounter, and examined the patient. There are no significant changes.  Patient seen and interviewed in pre-op holding area, reviewed risks of procedure including pain, bleeding, infection, uterine perforation.  Discussed benefit of tissue diagnosis.  All questions answered.    MD Joyce

## 2022-03-10 NOTE — ANESTHESIA CARE TRANSFER NOTE
Patient: Ashanti Yanes    Procedure: Procedure(s):  HYSTEROSCOPY, WITH DILATION AND CURETTAGE, POLYPECTOMY       Diagnosis: PMB (postmenopausal bleeding) [N95.0]  Diagnosis Additional Information: No value filed.    Anesthesia Type:   General     Note:    Oropharynx: oropharynx clear of all foreign objects and spontaneously breathing  Level of Consciousness: awake and unresponsive  Oxygen Supplementation: face mask  Level of Supplemental Oxygen (L/min / FiO2): 8  Independent Airway: airway patency satisfactory and stable  Dentition: dentition unchanged  Vital Signs Stable: post-procedure vital signs reviewed and stable  Report to RN Given: handoff report given  Patient transferred to: PACU    Handoff Report: Identifed the Patient, Identified the Reponsible Provider, Reviewed the pertinent medical history, Discussed the surgical course, Reviewed Intra-OP anesthesia mangement and issues during anesthesia, Set expectations for post-procedure period and Allowed opportunity for questions and acknowledgement of understanding      Vitals:  Vitals Value Taken Time   /65 03/10/22 0907   Temp 97.2  F (36.2  C) 03/10/22 0907   Pulse 64 03/10/22 0907   Resp 14 03/10/22 0907   SpO2 100 % 03/10/22 0907       Electronically Signed By: PARTHA Galaviz CRNA  March 10, 2022  9:13 AM

## 2022-03-10 NOTE — OP NOTE
Operative Note    Procedure date: 3/10/2022    Pre-procedure diagnosis: PMB with US suggestive of underlying polyp  Post-procedure diagnosis: Same    Surgeon: Idalmis Crocker MD    Procedure: hysteroscopy, polypectomy, dilation and currettage    Anesthesia: GETA    EBL: 5 ml    Fluid deficit: 500 ml    UOP: 10 mL    Specimens: Endometrial curettings and polyp    Findings: normal external genitalia, normal cervix, hysteroscopic findings included normal bilateral tubal ostia, endometrial polyp arising from the left uterine side wall    Indications: Ashanti Yanes is a 56 year old  who presented with PMB.  The risks, benefits and alternatives were discussed and the patient elected to proceed with the procedure.    Technique:   The patient was brought back to the OR. MAC anesthesia was administered and the patient was positioned in the dorsal lithotomy position with legs in Jean Paul stirrups. Patient was then prepped and draped in the usual fashion. Bimanual exam revealed the above findings. The bladder was drained with a straight catheter.    An open sided speculum was then placed in the vagina.  The posterior lip was then grasped with a single tooth tenaculum.      The cervical os was dilated with Hegar dilators to 6 mm and 5.5 mm operative hysteroscope was inserted into the cervical os under direct visualization.  The above findings were noted.  The MyoSure device was inserted and used to perform polypectomy. When adequate morcellization was complete, the uterine cavity inspected again. The uterus was able to be adequately distended without evidence of perforation.     The hysteroscope and tenaculum were removed from the vagina. A sponge stick was applied to the cervix and pressure applied until the site of the tenaculum was hemostatic.  All instruments were then removed from the vagina and the procedure was complete.     Patient was taken to recovery room in stable condition    Idalmis Crocker  MD

## 2022-03-10 NOTE — ANESTHESIA POSTPROCEDURE EVALUATION
Patient: Ashanti Yanes    Procedure: Procedure(s):  HYSTEROSCOPY, WITH DILATION AND CURETTAGE, POLYPECTOMY       Anesthesia Type:  General    Note:  Disposition: Outpatient   Postop Pain Control: Uneventful            Sign Out: Well controlled pain   PONV: No   Neuro/Psych: Uneventful            Sign Out: Acceptable/Baseline neuro status   Airway/Respiratory: Uneventful            Sign Out: Acceptable/Baseline resp. status   CV/Hemodynamics: Uneventful            Sign Out: Acceptable CV status; No obvious hypovolemia; No obvious fluid overload   Other NRE: NONE   DID A NON-ROUTINE EVENT OCCUR? No           Last vitals:  Vitals Value Taken Time   /55 03/10/22 0915   Temp 97.2  F (36.2  C) 03/10/22 0907   Pulse 59 03/10/22 0927   Resp 16 03/10/22 0925   SpO2 98 % 03/10/22 0927   Vitals shown include unvalidated device data.    Electronically Signed By: Jazmín Golden MD  March 10, 2022  10:09 AM

## 2022-07-15 ENCOUNTER — ALLIED HEALTH/NURSE VISIT (OUTPATIENT)
Dept: FAMILY MEDICINE | Facility: CLINIC | Age: 57
End: 2022-07-15
Payer: COMMERCIAL

## 2022-07-15 DIAGNOSIS — Z23 ENCOUNTER FOR IMMUNIZATION: Primary | ICD-10-CM

## 2022-07-15 PROCEDURE — 90632 HEPA VACCINE ADULT IM: CPT

## 2022-07-15 PROCEDURE — 99207 PR NO CHARGE NURSE ONLY: CPT

## 2022-07-15 PROCEDURE — 90471 IMMUNIZATION ADMIN: CPT

## 2022-11-19 ENCOUNTER — HEALTH MAINTENANCE LETTER (OUTPATIENT)
Age: 57
End: 2022-11-19

## 2022-12-02 ENCOUNTER — ANCILLARY PROCEDURE (OUTPATIENT)
Dept: MAMMOGRAPHY | Facility: CLINIC | Age: 57
End: 2022-12-02
Payer: COMMERCIAL

## 2022-12-02 DIAGNOSIS — Z12.31 VISIT FOR SCREENING MAMMOGRAM: ICD-10-CM

## 2022-12-02 PROCEDURE — 77067 SCR MAMMO BI INCL CAD: CPT | Mod: GC | Performed by: RADIOLOGY

## 2022-12-02 PROCEDURE — 77063 BREAST TOMOSYNTHESIS BI: CPT | Mod: GC | Performed by: RADIOLOGY

## 2023-04-09 ENCOUNTER — HEALTH MAINTENANCE LETTER (OUTPATIENT)
Age: 58
End: 2023-04-09

## 2023-04-19 ASSESSMENT — ENCOUNTER SYMPTOMS
PALPITATIONS: 0
HEMATOCHEZIA: 0
DYSURIA: 0
CONSTIPATION: 0
CHILLS: 0
HEMATURIA: 0
SHORTNESS OF BREATH: 0
COUGH: 0
NERVOUS/ANXIOUS: 0
JOINT SWELLING: 0
SORE THROAT: 0
FEVER: 0
WEAKNESS: 0
BREAST MASS: 0
ABDOMINAL PAIN: 0
FREQUENCY: 0
DIZZINESS: 0
NAUSEA: 0
PARESTHESIAS: 0
ARTHRALGIAS: 0
MYALGIAS: 0
HEARTBURN: 0
DIARRHEA: 0
EYE PAIN: 0
HEADACHES: 0

## 2023-04-21 ENCOUNTER — OFFICE VISIT (OUTPATIENT)
Dept: FAMILY MEDICINE | Facility: CLINIC | Age: 58
End: 2023-04-21
Payer: COMMERCIAL

## 2023-04-21 VITALS
HEART RATE: 60 BPM | BODY MASS INDEX: 23.77 KG/M2 | OXYGEN SATURATION: 100 % | SYSTOLIC BLOOD PRESSURE: 124 MMHG | RESPIRATION RATE: 16 BRPM | DIASTOLIC BLOOD PRESSURE: 69 MMHG | WEIGHT: 160.5 LBS | TEMPERATURE: 97.6 F | HEIGHT: 69 IN

## 2023-04-21 DIAGNOSIS — Z80.41 FAMILY HISTORY OF MALIGNANT NEOPLASM OF OVARY: ICD-10-CM

## 2023-04-21 DIAGNOSIS — Z00.00 PREVENTATIVE HEALTH CARE: ICD-10-CM

## 2023-04-21 DIAGNOSIS — Z11.4 SCREENING FOR HIV (HUMAN IMMUNODEFICIENCY VIRUS): ICD-10-CM

## 2023-04-21 DIAGNOSIS — Z82.49 FAMILY HISTORY OF CARDIOVASCULAR DISEASE: Primary | ICD-10-CM

## 2023-04-21 DIAGNOSIS — Z82.62 FAMILY HISTORY OF OSTEOPOROSIS IN MOTHER: ICD-10-CM

## 2023-04-21 LAB
APO A-I SERPL-MCNC: 27 MG/DL
CHOLEST SERPL-MCNC: 217 MG/DL
HDLC SERPL-MCNC: 61 MG/DL
LDLC SERPL CALC-MCNC: 142 MG/DL
NONHDLC SERPL-MCNC: 156 MG/DL
TRIGL SERPL-MCNC: 70 MG/DL

## 2023-04-21 PROCEDURE — 83695 ASSAY OF LIPOPROTEIN(A): CPT | Performed by: FAMILY MEDICINE

## 2023-04-21 PROCEDURE — 80061 LIPID PANEL: CPT | Performed by: FAMILY MEDICINE

## 2023-04-21 PROCEDURE — 99396 PREV VISIT EST AGE 40-64: CPT | Performed by: FAMILY MEDICINE

## 2023-04-21 PROCEDURE — 93005 ELECTROCARDIOGRAM TRACING: CPT | Performed by: FAMILY MEDICINE

## 2023-04-21 PROCEDURE — 87389 HIV-1 AG W/HIV-1&-2 AB AG IA: CPT | Performed by: FAMILY MEDICINE

## 2023-04-21 PROCEDURE — 36415 COLL VENOUS BLD VENIPUNCTURE: CPT | Performed by: FAMILY MEDICINE

## 2023-04-21 ASSESSMENT — ENCOUNTER SYMPTOMS
HEMATURIA: 0
PALPITATIONS: 0
CHILLS: 0
ABDOMINAL PAIN: 0
CONSTIPATION: 0
HEMATOCHEZIA: 0
SORE THROAT: 0
DYSURIA: 0
HEADACHES: 0
MYALGIAS: 0
NERVOUS/ANXIOUS: 0
COUGH: 0
PARESTHESIAS: 0
HEARTBURN: 0
JOINT SWELLING: 0
FREQUENCY: 0
DIARRHEA: 0
WEAKNESS: 0
EYE PAIN: 0
SHORTNESS OF BREATH: 0
FEVER: 0
BREAST MASS: 0
ARTHRALGIAS: 0
NAUSEA: 0
DIZZINESS: 0

## 2023-04-21 NOTE — PROGRESS NOTES
SUBJECTIVE:   CC: Ashanti is an 57 year old who presents for preventive health visit.       4/21/2023     6:52 AM   Additional Questions   Roomed by Lion BRYAN   Patient has been advised of split billing requirements and indicates understanding: Yes  Healthy Habits:     Getting at least 3 servings of Calcium per day:  Yes    Bi-annual eye exam:  NO    Dental care twice a year:  Yes    Sleep apnea or symptoms of sleep apnea:  None    Diet:  Regular (no restrictions)    Frequency of exercise:  4-5 days/week    Duration of exercise:  30-45 minutes    Taking medications regularly:  Yes    PHQ-2 Total Score: 0    Additional concerns today:  Yes    Met with genetic counselor about ovarian cancer in family. She is negative for BRCA gene!      Today's PHQ-2 Score:       4/19/2023    10:14 PM   PHQ-2 ( 1999 [employer])   Q1: Little interest or pleasure in doing things 0   Q2: Feeling down, depressed or hopeless 0   PHQ-2 Score 0   Q1: Little interest or pleasure in doing things Not at all   Q2: Feeling down, depressed or hopeless Not at all   PHQ-2 Score 0       Social History     Tobacco Use     Smoking status: Never     Passive exposure: Never     Smokeless tobacco: Never   Vaping Use     Vaping status: Never Used     Passive vaping exposure: Yes   Substance Use Topics     Alcohol use: Yes     Alcohol/week: 0.8 - 2.5 standard drinks of alcohol     Comment: occ             4/19/2023    10:13 PM   Alcohol Use   Prescreen: >3 drinks/day or >7 drinks/week? No     Reviewed orders with patient.  Reviewed health maintenance and updated orders accordingly - Yes  Lab work is in process  Labs reviewed in EPIC  BP Readings from Last 3 Encounters:   04/21/23 124/69   03/10/22 103/63   02/25/22 110/60    Wt Readings from Last 3 Encounters:   04/21/23 72.8 kg (160 lb 8 oz)   03/10/22 73.5 kg (162 lb)   02/25/22 73.5 kg (162 lb)                  Patient Active Problem List   Diagnosis     Family history of cardiovascular disease      CARDIOVASCULAR SCREENING; LDL GOAL LESS THAN 130     Dislocation of patella, left, closed     Dense breasts     Family history of malignant neoplasm of ovary     Family history of osteoporosis in mother     Past Surgical History:   Procedure Laterality Date     COLONOSCOPY N/A 2020    Procedure: COLONOSCOPY, WITH POLYPECTOMY;  Surgeon: Stephy Lopez MD;  Location: [facility]     DILATION AND CURETTAGE, OPERATIVE HYSTEROSCOPY, COMBINED N/A 3/10/2022    Procedure: HYSTEROSCOPY, WITH DILATION AND CURETTAGE, POLYPECTOMY;  Surgeon: Idalmis Crocker MD;  Location: [facility]     WISDOM TOOTH EXTRACTION       ZZC APPENDECTOMY      acute       Social History     Tobacco Use     Smoking status: Never     Passive exposure: Never     Smokeless tobacco: Never   Vaping Use     Vaping status: Never Used     Passive vaping exposure: Yes   Substance Use Topics     Alcohol use: Yes     Alcohol/week: 0.8 - 2.5 standard drinks of alcohol     Comment: occ     Family History   Problem Relation Age of Onset     Lipids Mother         resolved     Depression Mother      Hypertension Mother         Mother prescribed in her 80's high blood pressure medication     Osteoporosis Mother         Mother diagnosed at 86 in 2023 based on bone density test     C.A.D. Father         MI  age 59     Diabetes Father         Type 2     Heart Disease Father      Coronary Artery Disease Father         Father  at 60 of heart attack     Cerebrovascular Disease Brother      Heart Disease Brother 44     Coronary Artery Disease Brother         Brother  at 44 of heart attack     Myocardial Infarction Brother      Coronary Artery Disease Brother         arrhythmia     Cancer Maternal Grandmother         ovarian     Other Cancer Maternal Grandmother         Ovarian     Cerebrovascular Disease Maternal Grandfather      Other - See Comments Paternal Grandmother         reumatic fever     C.A.D. Paternal Grandfather         MI     Gynecology  Maternal Aunt         ovarian cysts     Gynecology Maternal Aunt         uterine tumor         Current Outpatient Medications   Medication Sig Dispense Refill     Glucosamine-Chondroitin (GLUCOSAMINE CHONDR COMPLEX PO)        Multiple Vitamin (MULTIVITAMIN ADULT PO)        VITAMIN D PO Takes 3 times a week       No Known Allergies    Breast Cancer Screening:    FHS-7:       11/11/2021     2:20 PM 12/2/2022     7:33 AM   Breast CA Risk Assessment (FHS-7)   Did any of your first-degree relatives have breast or ovarian cancer? No No   Did any of your relatives have bilateral breast cancer? No No   Did any man in your family have breast cancer? No No   Did any woman in your family have breast and ovarian cancer? No No   Did any woman in your family have breast cancer before age 50 y? No No   Do you have 2 or more relatives with breast and/or ovarian cancer? No No   Do you have 2 or more relatives with breast and/or bowel cancer? No No       Mammogram Screening: Recommended mammography every 1-2 years with patient discussion and risk factor consideration  Pertinent mammograms are reviewed under the imaging tab.    History of abnormal Pap smear: NO - age 30-65 PAP every 5 years with negative HPV co-testing recommended      Latest Ref Rng & Units 5/14/2019     4:40 PM 5/14/2019     4:06 PM 3/17/2016     6:14 PM   PAP / HPV   PAP (Historical)   NIL      HPV 16 DNA NEG^Negative Negative    Negative     HPV 18 DNA NEG^Negative Negative    Negative     Other HR HPV NEG^Negative Negative    Negative       Reviewed and updated as needed this visit by clinical staff   Tobacco  Allergies  Meds              Reviewed and updated as needed this visit by Provider                 Past Medical History:   Diagnosis Date     CARDIOVASCULAR SCREENING; LDL GOAL LESS THAN 130      Cervical high risk HPV (human papillomavirus) test positive 03/01/2015    NIL pap, + HPV (not 16 or 18)     Dislocation of patella, left, closed       Past  Surgical History:   Procedure Laterality Date     COLONOSCOPY N/A 2020    Procedure: COLONOSCOPY, WITH POLYPECTOMY;  Surgeon: Stephy Lopez MD;  Location: [facility]     DILATION AND CURETTAGE, OPERATIVE HYSTEROSCOPY, COMBINED N/A 3/10/2022    Procedure: HYSTEROSCOPY, WITH DILATION AND CURETTAGE, POLYPECTOMY;  Surgeon: Idalmis Crocker MD;  Location: [facility]     WISDOM TOOTH EXTRACTION       ZZC APPENDECTOMY      acute     OB History    Para Term  AB Living   0 0 0 0 0 0   SAB IAB Ectopic Multiple Live Births   0 0 0 0 0       Review of Systems   Constitutional: Negative for chills and fever.   HENT: Negative for congestion, ear pain, hearing loss and sore throat.    Eyes: Negative for pain and visual disturbance.   Respiratory: Negative for cough and shortness of breath.    Cardiovascular: Negative for chest pain, palpitations and peripheral edema.   Gastrointestinal: Negative for abdominal pain, constipation, diarrhea, heartburn, hematochezia and nausea.   Breasts:  Negative for tenderness, breast mass and discharge.   Genitourinary: Negative for dysuria, frequency, genital sores, hematuria, pelvic pain, urgency, vaginal bleeding and vaginal discharge.   Musculoskeletal: Negative for arthralgias, joint swelling and myalgias.   Skin: Negative for rash.   Neurological: Negative for dizziness, weakness, headaches and paresthesias.   Psychiatric/Behavioral: Negative for mood changes. The patient is not nervous/anxious.      CONSTITUTIONAL: NEGATIVE for fever, chills, change in weight  INTEGUMENTARY/SKIN: NEGATIVE for worrisome rashes, moles or lesions  EYES: NEGATIVE for vision changes or irritation  ENT: NEGATIVE for ear, mouth and throat problems  RESP: NEGATIVE for significant cough or SOB  BREAST: NEGATIVE for masses, tenderness or discharge  CV: NEGATIVE for chest pain, palpitations or peripheral edema  GI: NEGATIVE for nausea, abdominal pain, heartburn, or change in bowel  "habits  : NEGATIVE for unusual urinary or vaginal symptoms. No vaginal bleeding.  MUSCULOSKELETAL: NEGATIVE for significant arthralgias or myalgia  NEURO: NEGATIVE for weakness, dizziness or paresthesias  PSYCHIATRIC: NEGATIVE for changes in mood or affect      OBJECTIVE:   /69   Pulse 60   Temp 97.6  F (36.4  C) (Oral)   Resp 16   Ht 1.74 m (5' 8.5\")   Wt 72.8 kg (160 lb 8 oz)   LMP  (LMP Unknown)   SpO2 100%   Breastfeeding No   BMI 24.05 kg/m    Physical Exam  GENERAL: healthy, alert and no distress  EYES: Eyes grossly normal to inspection, PERRL and conjunctivae and sclerae normal  HENT: ear canals and TM's normal, nose and mouth without ulcers or lesions  NECK: no adenopathy, no asymmetry, masses, or scars and thyroid normal to palpation  RESP: lungs clear to auscultation - no rales, rhonchi or wheezes  CV: regular rate and rhythm, normal S1 S2, no S3 or S4, no murmur, click or rub, no peripheral edema and peripheral pulses strong  ABDOMEN: soft, nontender, no hepatosplenomegaly, no masses and bowel sounds normal  MS: no gross musculoskeletal defects noted, no edema  SKIN: no suspicious lesions or rashes  NEURO: Normal strength and tone, mentation intact and speech normal  PSYCH: mentation appears normal, affect normal/bright    Diagnostic Test Results:  Labs reviewed in Epic  Results for orders placed or performed in visit on 04/21/23 (from the past 24 hour(s))   Lipoprotein (a)   Result Value Ref Range    Lipoprotein (a) 27 <30 mg/dL   Lipid panel reflex to direct LDL Non-fasting   Result Value Ref Range    Cholesterol 217 (H) <200 mg/dL    Triglycerides 70 <150 mg/dL    Direct Measure HDL 61 >=50 mg/dL    LDL Cholesterol Calculated 142 (H) <=100 mg/dL    Non HDL Cholesterol 156 (H) <130 mg/dL    Narrative    Cholesterol  Desirable:  <200 mg/dL    Triglycerides  Normal:  Less than 150 mg/dL  Borderline High:  150-199 mg/dL  High:  200-499 mg/dL  Very High:  Greater than or equal to 500 " mg/dL    Direct Measure HDL  Female:  Greater than or equal to 50 mg/dL   Male:  Greater than or equal to 40 mg/dL    LDL Cholesterol  Desirable:  <100mg/dL  Above Desirable:  100-129 mg/dL   Borderline High:  130-159 mg/dL   High:  160-189 mg/dL   Very High:  >= 190 mg/dL    Non HDL Cholesterol  Desirable:  130 mg/dL  Above Desirable:  130-159 mg/dL  Borderline High:  160-189 mg/dL  High:  190-219 mg/dL  Very High:  Greater than or equal to 220 mg/dL       ASSESSMENT/PLAN:   (Z82.49) Family history of cardiovascular disease  (primary encounter diagnosis)  Comment: checking lipoprotein a, which is negative. That is helpful to know. I think a preventative cardiologist could be helpful to decide if she should pursue a statin. I'll mention that and see what she says.  Plan: Lipoprotein (a), EKG 12-lead, tracing only,         Lipid panel reflex to direct LDL Non-fasting    (Z00.00) Preventative health care  Comment: reviewed her health, which is excellent.  Plan: REVIEW OF HEALTH MAINTENANCE PROTOCOL ORDERS    (Z11.4) Screening for HIV (human immunodeficiency virus)  Comment: screening only  Plan: HIV Antigen Antibody Combo    (Z80.41) Family history of malignant neoplasm of ovary  Comment: she has been tested and knows she is BRCA negative.    (Z82.62) Family history of osteoporosis in mother  Comment: she does weight-bearing exercise, drinks milk and takes vit D which is helpful now      COUNSELING:  Reviewed preventive health counseling, as reflected in patient instructions       Regular exercise       Healthy diet/nutrition       Vision screening       Osteoporosis prevention/bone health       Colorectal Cancer Screening       Advance Care Planning      She reports that she has never smoked. She has never been exposed to tobacco smoke. She has never used smokeless tobacco.      Raven Ramos MD  [facility]

## 2023-04-22 LAB — HIV 1+2 AB+HIV1 P24 AG SERPL QL IA: NONREACTIVE

## 2023-11-02 ENCOUNTER — PATIENT OUTREACH (OUTPATIENT)
Dept: CARE COORDINATION | Facility: CLINIC | Age: 58
End: 2023-11-02
Payer: COMMERCIAL

## 2023-12-06 ENCOUNTER — ANCILLARY PROCEDURE (OUTPATIENT)
Dept: MAMMOGRAPHY | Facility: CLINIC | Age: 58
End: 2023-12-06
Payer: COMMERCIAL

## 2023-12-06 DIAGNOSIS — Z12.31 VISIT FOR SCREENING MAMMOGRAM: ICD-10-CM

## 2023-12-06 PROCEDURE — 77067 SCR MAMMO BI INCL CAD: CPT | Mod: GC | Performed by: RADIOLOGY

## 2023-12-06 PROCEDURE — 77063 BREAST TOMOSYNTHESIS BI: CPT | Mod: GC | Performed by: RADIOLOGY

## 2024-04-18 LAB — LAB SCANNED RESULT: NORMAL

## 2024-05-22 SDOH — HEALTH STABILITY: PHYSICAL HEALTH: ON AVERAGE, HOW MANY DAYS PER WEEK DO YOU ENGAGE IN MODERATE TO STRENUOUS EXERCISE (LIKE A BRISK WALK)?: 4 DAYS

## 2024-05-22 SDOH — HEALTH STABILITY: PHYSICAL HEALTH: ON AVERAGE, HOW MANY MINUTES DO YOU ENGAGE IN EXERCISE AT THIS LEVEL?: 40 MIN

## 2024-05-22 ASSESSMENT — SOCIAL DETERMINANTS OF HEALTH (SDOH): HOW OFTEN DO YOU GET TOGETHER WITH FRIENDS OR RELATIVES?: ONCE A WEEK

## 2024-05-23 ENCOUNTER — OFFICE VISIT (OUTPATIENT)
Dept: FAMILY MEDICINE | Facility: CLINIC | Age: 59
End: 2024-05-23
Payer: COMMERCIAL

## 2024-05-23 VITALS
TEMPERATURE: 98.2 F | BODY MASS INDEX: 23.25 KG/M2 | DIASTOLIC BLOOD PRESSURE: 69 MMHG | RESPIRATION RATE: 18 BRPM | HEART RATE: 60 BPM | HEIGHT: 69 IN | WEIGHT: 157 LBS | SYSTOLIC BLOOD PRESSURE: 110 MMHG | OXYGEN SATURATION: 98 %

## 2024-05-23 DIAGNOSIS — Z12.4 SCREENING FOR MALIGNANT NEOPLASM OF CERVIX: ICD-10-CM

## 2024-05-23 DIAGNOSIS — Z00.00 ROUTINE GENERAL MEDICAL EXAMINATION AT A HEALTH CARE FACILITY: Primary | ICD-10-CM

## 2024-05-23 LAB
ERYTHROCYTE [DISTWIDTH] IN BLOOD BY AUTOMATED COUNT: 12.5 % (ref 10–15)
HCT VFR BLD AUTO: 36.1 % (ref 35–47)
HGB BLD-MCNC: 12.2 G/DL (ref 11.7–15.7)
MCH RBC QN AUTO: 32.2 PG (ref 26.5–33)
MCHC RBC AUTO-ENTMCNC: 33.8 G/DL (ref 31.5–36.5)
MCV RBC AUTO: 95 FL (ref 78–100)
PLATELET # BLD AUTO: 283 10E3/UL (ref 150–450)
RBC # BLD AUTO: 3.79 10E6/UL (ref 3.8–5.2)
WBC # BLD AUTO: 8.4 10E3/UL (ref 4–11)

## 2024-05-23 PROCEDURE — 82306 VITAMIN D 25 HYDROXY: CPT | Performed by: FAMILY MEDICINE

## 2024-05-23 PROCEDURE — 85027 COMPLETE CBC AUTOMATED: CPT | Performed by: FAMILY MEDICINE

## 2024-05-23 PROCEDURE — 99396 PREV VISIT EST AGE 40-64: CPT | Performed by: FAMILY MEDICINE

## 2024-05-23 PROCEDURE — G0145 SCR C/V CYTO,THINLAYER,RESCR: HCPCS | Performed by: FAMILY MEDICINE

## 2024-05-23 PROCEDURE — 84443 ASSAY THYROID STIM HORMONE: CPT | Performed by: FAMILY MEDICINE

## 2024-05-23 PROCEDURE — 87624 HPV HI-RISK TYP POOLED RSLT: CPT | Performed by: FAMILY MEDICINE

## 2024-05-23 PROCEDURE — 36415 COLL VENOUS BLD VENIPUNCTURE: CPT | Performed by: FAMILY MEDICINE

## 2024-05-23 PROCEDURE — 80053 COMPREHEN METABOLIC PANEL: CPT | Performed by: FAMILY MEDICINE

## 2024-05-23 NOTE — PATIENT INSTRUCTIONS
"Preventive Care Advice   This is general advice we often give to help people stay healthy. Your care team may have specific advice just for you. Please talk to your care team about your own preventive care needs.  Lifestyle  Exercise at least 150 minutes each week (30 minutes a day, 5 days a week).  Do muscle strengthening activities 2 days a week. These help control your weight and prevent disease.  No smoking.  Wear sunscreen to prevent skin cancer.  Have your home tested for radon every 2 to 5 years. Radon is a colorless, odorless gas that can harm your lungs. To learn more, go to www.health.[facility].mn. and search for \"Radon in Homes.\"  Keep guns unloaded and locked up in a safe place like a safe or gun vault, or, use a gun lock and hide the keys. Always lock away bullets separately. To learn more, visit [employer].mn.gov and search for \"safe gun storage.\"  Nutrition  Eat 5 or more servings of fruits and vegetables each day.  Try wheat bread, brown rice and whole grain pasta (instead of white bread, rice, and pasta).  Get enough calcium and vitamin D. Check the label on foods and aim for 100% of the RDA (recommended daily allowance).  Regular exams  Have a dental exam and cleaning every 6 months.  See your health care team every year to talk about:  Any changes in your health.  Any medicines your care team has prescribed.  Preventive care, family planning, and ways to prevent chronic diseases.  Shots (vaccines)   HPV shots (up to age 26), if you've never had them before.  Hepatitis B shots (up to age 59), if you've never had them before.  COVID-19 shot: Get this shot when it's due.  Flu shot: Get a flu shot every year.  Tetanus shot: Get a tetanus shot every 10 years.  Pneumococcal, hepatitis A, and RSV shots: Ask your care team if you need these based on your risk.  Shingles shot (for age 50 and up).  General health tests  Diabetes screening:  Starting at age 35, Get screened for diabetes at least every 3 years.  If " you are younger than age 35, ask your care team if you should be screened for diabetes.  Cholesterol test: At age 39, start having a cholesterol test every 5 years, or more often if advised.  Bone density scan (DEXA): At age 50, ask your care team if you should have this scan for osteoporosis (brittle bones).  Hepatitis C: Get tested at least once in your life.  Abdominal aortic aneurysm screening: Talk to your doctor about having this screening if you:  Have ever smoked; and  Are biologically male; and  Are between the ages of 65 and 75.  STIs (sexually transmitted infections)  Before age 24: Ask your care team if you should be screened for STIs.  After age 24: Get screened for STIs if you're at risk. You are at risk for STIs (including HIV) if:  You are sexually active with more than one person.  You don't use condoms every time.  You or a partner was diagnosed with a sexually transmitted infection.  If you are at risk for HIV, ask about PrEP medicine to prevent HIV.  Get tested for HIV at least once in your life, whether you are at risk for HIV or not.  Cancer screening tests  Cervical cancer screening: If you have a cervix, begin getting regular cervical cancer screening tests at age 21. Most people who have regular screenings with normal results can stop after age 65. Talk about this with your provider.  Breast cancer scan (mammogram): If you've ever had breasts, begin having regular mammograms starting at age 40. This is a scan to check for breast cancer.  Colon cancer screening: It is important to start screening for colon cancer at age 45.  Have a colonoscopy test every 10 years (or more often if you're at risk) Or, ask your provider about stool tests like a FIT test every year or Cologuard test every 3 years.  To learn more about your testing options, visit: www.[employer]/246488.pdf.  For help making a decision, visit: francesca/jt91076.  Prostate cancer screening test: If you have a prostate and are age 55  to 69, ask your provider if you would benefit from a yearly prostate cancer screening test.  Lung cancer screening: If you are a current or former smoker age 50 to 80, ask your care team if ongoing lung cancer screenings are right for you.  For informational purposes only. Not to replace the advice of your health care provider. Copyright   2023 [employer]. All rights reserved. Clinically reviewed by the [facility] Transitions Program. [employer] [id number] - REV 04/24.    Learning About Stress  What is stress?     Stress is your body's response to a hard situation. Your body can have a physical, emotional, or mental response. Stress is a fact of life for most people, and it affects everyone differently. What causes stress for you may not be stressful for someone else.  A lot of things can cause stress. You may feel stress when you go on a job interview, take a test, or run a race. This kind of short-term stress is normal and even useful. It can help you if you need to work hard or react quickly. For example, stress can help you finish an important job on time.  Long-term stress is caused by ongoing stressful situations or events. Examples of long-term stress include long-term health problems, ongoing problems at work, or conflicts in your family. Long-term stress can harm your health.  How does stress affect your health?  When you are stressed, your body responds as though you are in danger. It makes hormones that speed up your heart, make you breathe faster, and give you a burst of energy. This is called the fight-or-flight stress response. If the stress is over quickly, your body goes back to normal and no harm is done.  But if stress happens too often or lasts too long, it can have bad effects. Long-term stress can make you more likely to get sick, and it can make symptoms of some diseases worse. If you tense up when you are stressed, you may develop neck, shoulder, or low back pain. Stress is  linked to high blood pressure and heart disease.  Stress also harms your emotional health. It can make you akins, tense, or depressed. Your relationships may suffer, and you may not do well at work or school.  What can you do to manage stress?  You can try these things to help manage stress:   Do something active. Exercise or activity can help reduce stress. Walking is a great way to get started. Even everyday activities such as housecleaning or yard work can help.  Try yoga or kelby chi. These techniques combine exercise and meditation. You may need some training at first to learn them.  Do something you enjoy. For example, listen to music or go to a movie. Practice your hobby or do volunteer work.  Meditate. This can help you relax, because you are not worrying about what happened before or what may happen in the future.  Do guided imagery. Imagine yourself in any setting that helps you feel calm. You can use online videos, books, or a teacher to guide you.  Do breathing exercises. For example:  From a standing position, bend forward from the waist with your knees slightly bent. Let your arms dangle close to the floor.  Breathe in slowly and deeply as you return to a standing position. Roll up slowly and lift your head last.  Hold your breath for just a few seconds in the standing position.  Breathe out slowly and bend forward from the waist.  Let your feelings out. Talk, laugh, cry, and express anger when you need to. Talking with supportive friends or family, a counselor, or a debbie leader about your feelings is a healthy way to relieve stress. Avoid discussing your feelings with people who make you feel worse.  Write. It may help to write about things that are bothering you. This helps you find out how much stress you feel and what is causing it. When you know this, you can find better ways to cope.  What can you do to prevent stress?  You might try some of these things to help prevent stress:  Manage your time.  "This helps you find time to do the things you want and need to do.  Get enough sleep. Your body recovers from the stresses of the day while you are sleeping.  Get support. Your family, friends, and community can make a difference in how you experience stress.  Limit your news feed. Avoid or limit time on social media or news that may make you feel stressed.  Do something active. Exercise or activity can help reduce stress. Walking is a great way to get started.  Where can you learn more?  Go to https://www.[employer].net/patiented  Enter N032 in the search box to learn more about \"Learning About Stress.\"  Current as of: October 24, 2023               Content Version: 14.0    9615-0424 Exit41.   Care instructions adapted under license by your healthcare professional. If you have questions about a medical condition or this instruction, always ask your healthcare professional. [employer] disclaims any warranty or liability for your use of this information.      Substance Use Disorder: Care Instructions  Overview     You can improve your life and health by stopping your use of alcohol or drugs. When you don't drink or use drugs, you may feel and sleep better. You may get along better with your family, friends, and coworkers. There are medicines and programs that can help with substance use disorder.  How can you care for yourself at home?  Here are some ways to help you stay sober and prevent relapse.  If you have been given medicine to help keep you sober or reduce your cravings, be sure to take it exactly as prescribed.  Talk to your doctor about programs that can help you stop using drugs or drinking alcohol.  Do not keep alcohol or drugs in your home.  Plan ahead. Think about what you'll say if other people ask you to drink or use drugs. Try not to spend time with people who drink or use drugs.  Use the time and money spent on drinking or drugs to do something that's important to " you.  Preventing a relapse  Have a plan to deal with relapse. Learn to recognize changes in your thinking that lead you to drink or use drugs. Get help before you start to drink or use drugs again.  Try to stay away from situations, friends, or places that may lead you to drink or use drugs.  If you feel the need to drink alcohol or use drugs again, seek help right away. Call a trusted friend or family member. Some people get support from organizations such as Narcotics Anonymous or [employer] or from treatment facilities.  If you relapse, get help as soon as you can. Some people make a plan with another person that outlines what they want that person to do for them if they relapse. The plan usually includes how to handle the relapse and who to notify in case of relapse.  Don't give up. Remember that a relapse doesn't mean that you have failed. Use the experience to learn the triggers that lead you to drink or use drugs. Then quit again. Recovery is a lifelong process. Many people have several relapses before they are able to quit for good.  Follow-up care is a key part of your treatment and safety. Be sure to make and go to all appointments, and call your doctor if you are having problems. It's also a good idea to know your test results and keep a list of the medicines you take.  When should you call for help?   Call 911  anytime you think you may need emergency care. For example, call if you or someone else:    Has overdosed or has withdrawal signs. Be sure to tell the emergency workers that you are or someone else is using or trying to quit using drugs. Overdose or withdrawal signs may include:  Losing consciousness.  Seizure.  Seeing or hearing things that aren't there (hallucinations).     Is thinking or talking about suicide or harming others.   Where to get help 24 hours a day, 7 days a week   If you or someone you know talks about suicide, self-harm, a mental health crisis, a substance use crisis, or any  "other kind of emotional distress, get help right away. You can:    Call the Suicide and Crisis Lifeline at 988.     Call 4-873-040-PAKF (1-925.582.1244).     Text HOME to 389680 to access the Crisis Text Line.   Consider saving these numbers in your phone.  Go to [employer] for more information or to chat online.  Call your doctor now or seek immediate medical care if:    You are having withdrawal symptoms. These may include nausea or vomiting, sweating, shakiness, and anxiety.   Watch closely for changes in your health, and be sure to contact your doctor if:    You have a relapse.     You need more help or support to stop.   Where can you learn more?  Go to https://www.[employer].net/patiented  Enter H573 in the search box to learn more about \"Substance Use Disorder: Care Instructions.\"  Current as of: November 15, 2023               Content Version: 14.0    0141-0831 [employer].   Care instructions adapted under license by your healthcare professional. If you have questions about a medical condition or this instruction, always ask your healthcare professional. Healthwise, [employer] disclaims any warranty or liability for your use of this information.      " none

## 2024-05-23 NOTE — PROGRESS NOTES
Preventive Care Visit  [facility] Hospital[facility] MD, Family Medicine  May 23, 2024      Assessment & Plan     Routine general medical examination at a health care facility  Screening labs below.   - Comprehensive metabolic panel (BMP + Alb, Alk Phos, ALT, AST, Total. Bili, TP)  - TSH with free T4 reflex  - CBC with platelets  - Vitamin D Deficiency  - Comprehensive metabolic panel (BMP + Alb, Alk Phos, ALT, AST, Total. Bili, TP)  - TSH with free T4 reflex  - CBC with platelets  - Vitamin D Deficiency    Screening for malignant neoplasm of cervix  - Gynecologic Cytology (Pap) and HPV - Recommended Age 30-65 Years  - Gynecologic Cytology (PAP)        Counseling  Appropriate preventive services were discussed with this patient, including applicable screening as appropriate for fall prevention, nutrition, physical activity, Tobacco-use cessation, weight loss and cognition.  Checklist reviewing preventive services available has been given to the patient.  Reviewed patient's diet, addressing concerns and/or questions.       No follow-ups on file.      Yahaira Burrell is a 58 year old, presenting for the following:  Physical and ingrowing hair under armpit         5/23/2024     5:07 PM   Additional Questions   Roomed by giancarlo isaacs   Accompanied by self         5/23/2024     5:07 PM   Patient Reported Additional Medications   Patient reports taking the following new medications Sergio phillips        Health Care Directive  Patient does not have a Health Care Directive or Living Will: Discussed advance care planning with patient; information given to patient to review.    HPI          5/22/2024   General Health   How would you rate your overall physical health? Good   Feel stress (tense, anxious, or unable to sleep) To some extent   (!) STRESS CONCERN      5/22/2024   Nutrition   Three or more servings of calcium each day? Yes   Diet: Regular (no restrictions)   How many servings of fruit and vegetables per  day? 4 or more   How many sweetened beverages each day? 0-1         5/22/2024   Exercise   Days per week of moderate/strenous exercise 4 days   Average minutes spent exercising at this level 40 min         5/22/2024   Social Factors   Frequency of gathering with friends or relatives Once a week   Worry food won't last until get money to buy more No   Food not last or not have enough money for food? No   Do you have housing?  Yes   Are you worried about losing your housing? No   Lack of transportation? No   Unable to get utilities (heat,electricity)? No         5/22/2024   Fall Risk   Fallen 2 or more times in the past year? No   Trouble with walking or balance? No          5/22/2024   Dental   Dentist two times every year? Yes         5/22/2024   TB Screening   Were you born outside of the [place]? No         Today's PHQ-2 Score:       5/22/2024     9:49 PM   PHQ-2 ( 1999 Pfizer)   Q1: Little interest or pleasure in doing things 0   Q2: Feeling down, depressed or hopeless 1   PHQ-2 Score 1   Q1: Little interest or pleasure in doing things Not at all   Q2: Feeling down, depressed or hopeless Several days   PHQ-2 Score 1           5/22/2024   Substance Use   Alcohol more than 3/day or more than 7/wk No   Do you use any other substances recreationally? (!) ALCOHOL     Social History     Tobacco Use    Smoking status: Never     Passive exposure: Never    Smokeless tobacco: Never   Vaping Use    Vaping status: Never Used   Substance Use Topics    Alcohol use: Yes     Alcohol/week: 0.8 - 2.5 standard drinks of alcohol     Comment: occ    Drug use: No           12/6/2023   LAST FHS-7 RESULTS   1st degree relative breast or ovarian cancer No   Any relative bilateral breast cancer No   Any male have breast cancer No   Any ONE woman have BOTH breast AND ovarian cancer No   Any woman with breast cancer before 50yrs No   2 or more relatives with breast AND/OR ovarian cancer No   2 or more relatives with breast AND/OR bowel cancer No  "       Mammogram Screening - Mammogram every 1-2 years updated in Health Maintenance based on mutual decision making        5/22/2024   STI Screening   New sexual partner(s) since last STI/HIV test? No     History of abnormal Pap smear: No - age 30- 64 PAP with HPV every 5 years recommended        Latest Ref Rng & Units 5/23/2024     5:44 PM 5/14/2019     4:40 PM 5/14/2019     4:06 PM   PAP / HPV   PAP  Negative for Intraepithelial Lesion or Malignancy (NILM)      PAP (Historical)    NIL    HPV 16 DNA Negative Negative  Negative     HPV 18 DNA Negative Negative  Negative     Other HR HPV Negative Negative  Negative       ASCVD Risk   The 10-year ASCVD risk score (Tc GOLD, et al., 2019) is: 1.6%    Values used to calculate the score:      Age: 58 years      Sex: Female      Is Non- : No      Diabetic: No      Tobacco smoker: No      Systolic Blood Pressure: 110 mmHg      Is BP treated: No      HDL Cholesterol: 61 mg/dL      Total Cholesterol: 217 mg/dL           Reviewed and updated as needed this visit by Provider                             Objective    Exam  /69   Pulse 60   Temp 98.2  F (36.8  C) (Oral)   Resp 18   Ht 1.743 m (5' 8.62\")   Wt 71.2 kg (157 lb)   LMP  (LMP Unknown)   SpO2 98%   BMI 23.44 kg/m     Estimated body mass index is 23.44 kg/m  as calculated from the following:    Height as of this encounter: 1.743 m (5' 8.62\").    Weight as of this encounter: 71.2 kg (157 lb).    Physical Exam  GENERAL: alert and no distress  NECK: no adenopathy, no asymmetry, masses, or scars  RESP: lungs clear to auscultation - no rales, rhonchi or wheezes  CV: regular rate and rhythm, normal S1 S2, no S3 or S4, no murmur, click or rub, no peripheral edema  ABDOMEN: soft, nontender, no hepatosplenomegaly, no masses and bowel sounds normal   (female): normal female external genitalia, normal urethral meatus, normal vaginal mucosa  MS: no gross musculoskeletal defects " noted, no edema        Signed Electronically by: Julio César Conway MD

## 2024-05-24 LAB
ALBUMIN SERPL BCG-MCNC: 4.8 G/DL (ref 3.5–5.2)
ALP SERPL-CCNC: 103 U/L (ref 40–150)
ALT SERPL W P-5'-P-CCNC: 20 U/L (ref 0–50)
ANION GAP SERPL CALCULATED.3IONS-SCNC: 10 MMOL/L (ref 7–15)
AST SERPL W P-5'-P-CCNC: 22 U/L (ref 0–45)
BILIRUB SERPL-MCNC: 0.3 MG/DL
BUN SERPL-MCNC: 14.3 MG/DL (ref 6–20)
CALCIUM SERPL-MCNC: 9.9 MG/DL (ref 8.6–10)
CHLORIDE SERPL-SCNC: 102 MMOL/L (ref 98–107)
CREAT SERPL-MCNC: 0.77 MG/DL (ref 0.51–0.95)
DEPRECATED HCO3 PLAS-SCNC: 26 MMOL/L (ref 22–29)
EGFRCR SERPLBLD CKD-EPI 2021: 90 ML/MIN/1.73M2
GLUCOSE SERPL-MCNC: 91 MG/DL (ref 70–99)
HPV HR 12 DNA CVX QL NAA+PROBE: NEGATIVE
HPV16 DNA CVX QL NAA+PROBE: NEGATIVE
HPV18 DNA CVX QL NAA+PROBE: NEGATIVE
HUMAN PAPILLOMA VIRUS FINAL DIAGNOSIS: NORMAL
POTASSIUM SERPL-SCNC: 4.4 MMOL/L (ref 3.4–5.3)
PROT SERPL-MCNC: 8.2 G/DL (ref 6.4–8.3)
SODIUM SERPL-SCNC: 138 MMOL/L (ref 135–145)
TSH SERPL DL<=0.005 MIU/L-ACNC: 1.64 UIU/ML (ref 0.3–4.2)
VIT D+METAB SERPL-MCNC: 27 NG/ML (ref 20–50)

## 2024-05-31 LAB
BKR LAB AP GYN ADEQUACY: NORMAL
BKR LAB AP GYN INTERPRETATION: NORMAL
BKR LAB AP PREVIOUS ABNORMAL: NORMAL
PATH REPORT.COMMENTS IMP SPEC: NORMAL
PATH REPORT.COMMENTS IMP SPEC: NORMAL
PATH REPORT.RELEVANT HX SPEC: NORMAL

## 2024-11-06 ENCOUNTER — PATIENT OUTREACH (OUTPATIENT)
Dept: CARE COORDINATION | Facility: CLINIC | Age: 59
End: 2024-11-06
Payer: COMMERCIAL

## 2024-12-09 ENCOUNTER — PATIENT OUTREACH (OUTPATIENT)
Dept: CARE COORDINATION | Facility: CLINIC | Age: 59
End: 2024-12-09
Payer: COMMERCIAL

## 2025-04-22 ENCOUNTER — DOCUMENTATION ONLY (OUTPATIENT)
Dept: ONCOLOGY | Facility: CLINIC | Age: 60
End: 2025-04-22
Payer: COMMERCIAL

## 2025-04-22 DIAGNOSIS — Z80.41 FAMILY HISTORY OF MALIGNANT NEOPLASM OF OVARY: Primary | ICD-10-CM

## 2025-04-22 NOTE — LETTER
April 22, 2025    Ashanti Yanes  [address]  [place] [address]      Dear Ashanti,    I hope this letter finds you well. Below, I have included a note summarizing a recent update to your prior genetic testing results. If you have any questions after receiving this letter, please feel free to contact me.    Referring Provider: Chelly Canchola PA-C    Presenting Information:  Ashanti was previously seen in the Cancer Risk Management Program due to the personal and family history of ovarian and prostate cancer. BRCANext-Expanded panel was ordered  from [facility]. 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 This variant is called c.1556G>T (p.R519L). No clinically significant variants were detected at that time in the 23 genes tested: KATIA, BARD1, BRCA1, BRCA2, BRIP1, CDH1, CHEK2, DICER1, EPCAM, MLH1, MSH2, MSH6, NBN, NF1, PALB2, PMS2, PTEN, RAD51C, RAD51D, RECQL, SMARCA4, STK11, and TP53 genes.    Result Reclassification:  Recently [facility] contacted me with a new report. The VUS in the CHEK2 gene has been reclassified as Likely Benign. This means that the CHEK2 variant found in Ashanti is most likely NOT associated with an increased risk for cancer.      Screening Recommendations:  Ashanti should continue with cancer screening based on personal medical and family history, as previously discussed.    Summary:  We do not have an explanation for Ashanti's family history of cancer. While no genetic changes were identified, Ashanti may still be at risk for certain cancers due to family history, environmental factors, or other genetic causes not identified by this test. Because of that, it is important that she continue with cancer screening based on her personal and family history as previously discussed.    Genetic testing is rapidly advancing, and new cancer susceptibility genes will most likely be identified in the future. Therefore, I encouraged Ashanti to  contact me annually or if there are changes in her personal or family history. This may change how we assess her cancer risk, screening, and the testing we would offer.        Plan:   1) I will send Ashanti a copy of the new test report that reflects the change in classification.  2) If there are any further questions or concerns, she should not hesitate to contact me via [employer].    Tomas Galloway MS, [facility]  Licensed, Certified Genetic Counselor

## 2025-04-22 NOTE — PROGRESS NOTES
4/22/2025    Referring Provider: Chelly Canchola PA-C    Presenting Information:  Ashanti was previously seen in the Cancer Risk Management Program due to the personal and family history of ovarian and prostate cancer. [facility]CANext-Expanded panel was ordered  from [facility]. 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 This variant is called c.1556G>T (p.R519L). No clinically significant variants were detected at that time in the 23 genes tested: KATIA, BARD1, BRCA1, BRCA2, BRIP1, CDH1, CHEK2, DICER1, EPCAM, MLH1, MSH2, MSH6, NBN, NF1, PALB2, PMS2, PTEN, RAD51C, RAD51D, RECQL, SMARCA4, STK11, and TP53 genes.    Result Reclassification:  Recently [facility] contacted me with a new report. The VUS in the CHEK2 gene has been reclassified as Likely Benign. This means that the CHEK2 variant found in Ashanti is most likely NOT associated with an increased risk for cancer.      Screening Recommendations:  Ashanti should continue with cancer screening based on personal medical and family history, as previously discussed.    Summary:  We do not have an explanation for Ashanti's family history of cancer. While no genetic changes were identified, Ashanti may still be at risk for certain cancers due to family history, environmental factors, or other genetic causes not identified by this test. Because of that, it is important that she continue with cancer screening based on her personal and family history as previously discussed.    Genetic testing is rapidly advancing, and new cancer susceptibility genes will most likely be identified in the future. Therefore, I encouraged Ashanti to contact me annually or if there are changes in her personal or family history. This may change how we assess her cancer risk, screening, and the testing we would offer.    Plan:   1) I will send Ashanti a copy of the new test report that reflects the change in classification.  2) If there are any further questions or concerns, she  should not hesitate to contact me via [employer].    Tomas Galloway MS, [facility] [place]  Licensed, Certified Genetic Counselor

## 2025-05-08 ENCOUNTER — PATIENT OUTREACH (OUTPATIENT)
Dept: CARE COORDINATION | Facility: CLINIC | Age: 60
End: 2025-05-08
Payer: COMMERCIAL

## 2025-06-23 SDOH — HEALTH STABILITY: PHYSICAL HEALTH: ON AVERAGE, HOW MANY DAYS PER WEEK DO YOU ENGAGE IN MODERATE TO STRENUOUS EXERCISE (LIKE A BRISK WALK)?: 5 DAYS

## 2025-06-23 SDOH — HEALTH STABILITY: PHYSICAL HEALTH: ON AVERAGE, HOW MANY MINUTES DO YOU ENGAGE IN EXERCISE AT THIS LEVEL?: 40 MIN

## 2025-06-23 ASSESSMENT — SOCIAL DETERMINANTS OF HEALTH (SDOH): HOW OFTEN DO YOU GET TOGETHER WITH FRIENDS OR RELATIVES?: ONCE A WEEK

## 2025-06-24 ENCOUNTER — OFFICE VISIT (OUTPATIENT)
Dept: FAMILY MEDICINE | Facility: CLINIC | Age: 60
End: 2025-06-24
Payer: COMMERCIAL

## 2025-06-24 VITALS
WEIGHT: 163 LBS | BODY MASS INDEX: 24.71 KG/M2 | HEIGHT: 68 IN | TEMPERATURE: 98 F | HEART RATE: 63 BPM | OXYGEN SATURATION: 99 % | SYSTOLIC BLOOD PRESSURE: 120 MMHG | DIASTOLIC BLOOD PRESSURE: 76 MMHG | RESPIRATION RATE: 16 BRPM

## 2025-06-24 DIAGNOSIS — Z00.00 ROUTINE GENERAL MEDICAL EXAMINATION AT A HEALTH CARE FACILITY: Primary | ICD-10-CM

## 2025-06-24 DIAGNOSIS — Z23 IMMUNIZATION DUE: ICD-10-CM

## 2025-06-24 DIAGNOSIS — L98.9 SKIN LESION: ICD-10-CM

## 2025-06-24 LAB
ALBUMIN SERPL BCG-MCNC: 4.3 G/DL (ref 3.5–5.2)
ALP SERPL-CCNC: 93 U/L (ref 40–150)
ALT SERPL W P-5'-P-CCNC: 16 U/L (ref 0–50)
ANION GAP SERPL CALCULATED.3IONS-SCNC: 9 MMOL/L (ref 7–15)
AST SERPL W P-5'-P-CCNC: 20 U/L (ref 0–45)
BILIRUB SERPL-MCNC: 0.3 MG/DL
BUN SERPL-MCNC: 13.3 MG/DL (ref 6–20)
CALCIUM SERPL-MCNC: 9.4 MG/DL (ref 8.8–10.4)
CHLORIDE SERPL-SCNC: 105 MMOL/L (ref 98–107)
CHOLEST SERPL-MCNC: 198 MG/DL
CREAT SERPL-MCNC: 0.82 MG/DL (ref 0.51–0.95)
EGFRCR SERPLBLD CKD-EPI 2021: 83 ML/MIN/1.73M2
FASTING STATUS PATIENT QL REPORTED: YES
FASTING STATUS PATIENT QL REPORTED: YES
GLUCOSE SERPL-MCNC: 86 MG/DL (ref 70–99)
HCO3 SERPL-SCNC: 27 MMOL/L (ref 22–29)
HDLC SERPL-MCNC: 54 MG/DL
LDLC SERPL CALC-MCNC: 127 MG/DL
NONHDLC SERPL-MCNC: 144 MG/DL
POTASSIUM SERPL-SCNC: 4.1 MMOL/L (ref 3.4–5.3)
PROT SERPL-MCNC: 7.6 G/DL (ref 6.4–8.3)
SODIUM SERPL-SCNC: 141 MMOL/L (ref 135–145)
TRIGL SERPL-MCNC: 83 MG/DL
TSH SERPL DL<=0.005 MIU/L-ACNC: 2.06 UIU/ML (ref 0.3–4.2)

## 2025-06-24 PROCEDURE — 90471 IMMUNIZATION ADMIN: CPT | Performed by: FAMILY MEDICINE

## 2025-06-24 PROCEDURE — 90677 PCV20 VACCINE IM: CPT | Performed by: FAMILY MEDICINE

## 2025-06-24 PROCEDURE — 36415 COLL VENOUS BLD VENIPUNCTURE: CPT | Performed by: FAMILY MEDICINE

## 2025-06-24 PROCEDURE — 80053 COMPREHEN METABOLIC PANEL: CPT | Performed by: FAMILY MEDICINE

## 2025-06-24 PROCEDURE — 3074F SYST BP LT 130 MM HG: CPT | Performed by: FAMILY MEDICINE

## 2025-06-24 PROCEDURE — 84443 ASSAY THYROID STIM HORMONE: CPT | Performed by: FAMILY MEDICINE

## 2025-06-24 PROCEDURE — 80061 LIPID PANEL: CPT | Performed by: FAMILY MEDICINE

## 2025-06-24 PROCEDURE — 3078F DIAST BP <80 MM HG: CPT | Performed by: FAMILY MEDICINE

## 2025-06-24 PROCEDURE — 99396 PREV VISIT EST AGE 40-64: CPT | Mod: 25 | Performed by: FAMILY MEDICINE

## 2025-06-24 PROCEDURE — 99213 OFFICE O/P EST LOW 20 MIN: CPT | Mod: 25 | Performed by: FAMILY MEDICINE

## 2025-06-24 NOTE — PROGRESS NOTES
Preventive Care Visit  [facility], [facility]  2025      Assessment & Plan     Routine general medical examination at a health care facility  Screening labs below.   - TSH with free T4 reflex  - Comprehensive metabolic panel (BMP + Alb, Alk Phos, ALT, AST, Total. Bili, TP)  - Lipid panel reflex to direct LDL Fasting  - TSH with free T4 reflex  - Comprehensive metabolic panel (BMP + Alb, Alk Phos, ALT, AST, Total. Bili, TP)  - Lipid panel reflex to direct LDL Fasting    Immunization due  - Pneumococcal 20 Valent Conjugate (PCV20)    Skin lesion  Two nevi below left eye but underlying new raised lesion? Will refer to derm due to location.   - Adult Dermatology  Referral      Reviewed preventive health counseling, as reflected in patient instructions  Counseling  Appropriate preventive services were addressed with this patient via screening, questionnaire, or discussion as appropriate for fall prevention, nutrition, physical activity, Tobacco-use cessation, social engagement, weight loss and cognition.  Checklist reviewing preventive services available has been given to the patient.  Reviewed patient's diet, addressing concerns and/or questions.         Follow-up  Return in about 1 year (around 2026) for Routine preventive.    Yahaira Burrell is a 59 year old, presenting for the following:  Physical          HPI       Father had normal cholesterol and had a heart attack at 65 and . Mom had high cholesterol and no heart attack.       Advance Care Planning    Advance care planning document is on file but is outdated.  Patient encouraged to update or provider to update POLST.        2025   General Health   How would you rate your overall physical health? Good   Feel stress (tense, anxious, or unable to sleep) Only a little   (!) STRESS CONCERN      2025   Nutrition   Three or more servings of calcium each day? Yes   Diet: Regular (no  restrictions)   How many servings of fruit and vegetables per day? (!) 2-3   How many sweetened beverages each day? 0-1         6/23/2025   Exercise   Days per week of moderate/strenous exercise 5 days   Average minutes spent exercising at this level 40 min         6/23/2025   Social Factors   Frequency of gathering with friends or relatives Once a week   Worry food won't last until get money to buy more No   Food not last or not have enough money for food? No   Do you have housing? (Housing is defined as stable permanent housing and does not include staying outside in a car, in a tent, in an abandoned building, in an overnight shelter, or couch-surfing.) Yes   Are you worried about losing your housing? No   Lack of transportation? No   Unable to get utilities (heat,electricity)? No         6/23/2025   Fall Risk   Fallen 2 or more times in the past year? No   Trouble with walking or balance? No          6/23/2025   Dental   Dentist two times every year? Yes         Today's PHQ-2 Score:       6/23/2025    10:08 PM   PHQ-2 ( 1999 [employer])   Q1: Little interest or pleasure in doing things 0   Q2: Feeling down, depressed or hopeless 1   PHQ-2 Score 1    Q1: Little interest or pleasure in doing things Not at all   Q2: Feeling down, depressed or hopeless Several days   PHQ-2 Score 1       Patient-reported           6/23/2025   Substance Use   Alcohol more than 3/day or more than 7/wk No   Do you use any other substances recreationally? No     Social History     Tobacco Use    Smoking status: Never     Passive exposure: Never    Smokeless tobacco: Never   Vaping Use    Vaping status: Never Used   Substance Use Topics    Alcohol use: Yes     Alcohol/week: 0.8 - 2.5 standard drinks of alcohol     Comment: occ    Drug use: No           12/11/2024   LAST FHS-7 RESULTS   1st degree relative breast or ovarian cancer No   Any relative bilateral breast cancer No   Any male have breast cancer No   Any ONE woman have BOTH breast AND  "ovarian cancer No   Any woman with breast cancer before 50yrs No   2 or more relatives with breast AND/OR ovarian cancer No   2 or more relatives with breast AND/OR bowel cancer No        Mammogram Screening - Mammogram every 1-2 years updated in Health Maintenance based on mutual decision making        6/23/2025   STI Screening   New sexual partner(s) since last STI/HIV test? No     History of abnormal Pap smear: No - age 30- 64 PAP with HPV every 5 years recommended        Latest Ref Rng & Units 5/23/2024     5:44 PM 5/14/2019     4:40 PM 5/14/2019     4:06 PM   PAP / HPV   PAP  Negative for Intraepithelial Lesion or Malignancy (NILM)      PAP (Historical)    NIL    HPV 16 DNA Negative Negative  Negative     HPV 18 DNA Negative Negative  Negative     Other HR HPV Negative Negative  Negative       ASCVD Risk   The 10-year ASCVD risk score (Tc GOLD, et al., 2019) is: 2.1%    Values used to calculate the score:      Age: 59 years      Sex: Female      Is Non- : No      Diabetic: No      Tobacco smoker: No      Systolic Blood Pressure: 120 mmHg      Is BP treated: No      HDL Cholesterol: 61 mg/dL      Total Cholesterol: 217 mg/dL           Reviewed and updated as needed this visit by Provider   Tobacco  Allergies  Meds  Problems  Med Hx  Surg Hx  Fam Hx                   Objective    Exam  /76   Pulse 63   Temp 98  F (36.7  C) (Oral)   Resp 16   Ht 1.733 m (5' 8.23\")   Wt 73.9 kg (163 lb)   LMP  (LMP Unknown)   SpO2 99%   BMI 24.62 kg/m     Estimated body mass index is 24.62 kg/m  as calculated from the following:    Height as of this encounter: 1.733 m (5' 8.23\").    Weight as of this encounter: 73.9 kg (163 lb).    Physical Exam  GENERAL: alert and no distress  NECK: no adenopathy, no asymmetry, masses, or scars  SKIN: hyperpigmented well defined <1mm nevi under left eye, underlying raised flesh colored flaky lesion  RESP: lungs clear to auscultation - no " rales, rhonchi or wheezes  CV: regular rate and rhythm, normal S1 S2, no S3 or S4, no murmur, click or rub, no peripheral edema  ABDOMEN: soft, nontender, no hepatosplenomegaly, no masses and bowel sounds normal  MS: no gross musculoskeletal defects noted, no edema  NEURO:       Signed Electronically by: Julio César Conway MD

## 2025-06-24 NOTE — PATIENT INSTRUCTIONS
Patient Education   Preventive Care Advice   This is general advice given by our system to help you stay healthy. However, your care team may have specific advice just for you. Please talk to your care team about your preventive care needs.  Nutrition  Eat 5 or more servings of fruits and vegetables each day.  Try wheat bread, brown rice and whole grain pasta (instead of white bread, rice, and pasta).  Get enough calcium and vitamin D. Check the label on foods and aim for 100% of the RDA (recommended daily allowance).  Lifestyle  Exercise at least 150 minutes each week  (30 minutes a day, 5 days a week).  Do muscle strengthening activities 2 days a week. These help control your weight and prevent disease.  No smoking.  Wear sunscreen to prevent skin cancer.  Have a dental exam and cleaning every 6 months.  Yearly exams  See your health care team every year to talk about:  Any changes in your health.  Any medicines your care team has prescribed.  Preventive care, family planning, and ways to prevent chronic diseases.  Shots (vaccines)   HPV shots (up to age 26), if you've never had them before.  Hepatitis B shots (up to age 59), if you've never had them before.  COVID-19 shot: Get this shot when it's due.  Flu shot: Get a flu shot every year.  Tetanus shot: Get a tetanus shot every 10 years.  Pneumococcal, hepatitis A, and RSV shots: Ask your care team if you need these based on your risk.  Shingles shot (for age 50 and up)  General health tests  Diabetes screening:  Starting at age 35, Get screened for diabetes at least every 3 years.  If you are younger than age 35, ask your care team if you should be screened for diabetes.  Cholesterol test: At age 39, start having a cholesterol test every 5 years, or more often if advised.  Bone density scan (DEXA): At age 50, ask your care team if you should have this scan for osteoporosis (brittle bones).  Hepatitis C: Get tested at least once in your life.  STIs (sexually  transmitted infections)  Before age 24: Ask your care team if you should be screened for STIs.  After age 24: Get screened for STIs if you're at risk. You are at risk for STIs (including HIV) if:  You are sexually active with more than one person.  You don't use condoms every time.  You or a partner was diagnosed with a sexually transmitted infection.  If you are at risk for HIV, ask about PrEP medicine to prevent HIV.  Get tested for HIV at least once in your life, whether you are at risk for HIV or not.  Cancer screening tests  Cervical cancer screening: If you have a cervix, begin getting regular cervical cancer screening tests starting at age 21.  Breast cancer scan (mammogram): If you've ever had breasts, begin having regular mammograms starting at age 40. This is a scan to check for breast cancer.  Colon cancer screening: It is important to start screening for colon cancer at age 45.  Have a colonoscopy test every 10 years (or more often if you're at risk) Or, ask your provider about stool tests like a FIT test every year or Cologuard test every 3 years.  To learn more about your testing options, visit:   .  For help making a decision, visit:   https://bit.ly/ay32652.  Prostate cancer screening test: If you have a prostate, ask your care team if a prostate cancer screening test (PSA) at age 55 is right for you.  Lung cancer screening: If you are a current or former smoker ages 50 to 80, ask your care team if ongoing lung cancer screenings are right for you.  For informational purposes only. Not to replace the advice of your health care provider. Copyright   2023 [employer]. All rights reserved. Clinically reviewed by the [facility]. [employer] [id number] - REV 01/24.

## (undated) DEVICE — SUCTION MANIFOLD NEPTUNE 2 SYS 1 PORT 702-025-000

## (undated) DEVICE — TUBING SUCTION 12"X1/4" N612

## (undated) DEVICE — SOL WATER IRRIG 1000ML BOTTLE 2F7114

## (undated) DEVICE — GOWN IMPERVIOUS 2XL BLUE

## (undated) DEVICE — KIT ENDO TURNOVER/PROCEDURE CARRY-ON 101822

## (undated) DEVICE — SPECIMEN CONTAINER 3OZ W/FORMALIN 59901

## (undated) RX ORDER — FENTANYL CITRATE 50 UG/ML
INJECTION, SOLUTION INTRAMUSCULAR; INTRAVENOUS
Status: DISPENSED
Start: 2020-09-22

## (undated) RX ORDER — ONDANSETRON 2 MG/ML
INJECTION INTRAMUSCULAR; INTRAVENOUS
Status: DISPENSED
Start: 2020-09-22